# Patient Record
Sex: FEMALE | Race: WHITE | NOT HISPANIC OR LATINO | Employment: OTHER | URBAN - METROPOLITAN AREA
[De-identification: names, ages, dates, MRNs, and addresses within clinical notes are randomized per-mention and may not be internally consistent; named-entity substitution may affect disease eponyms.]

---

## 2017-01-06 ENCOUNTER — THERAPY VISIT (OUTPATIENT)
Dept: PHYSICAL THERAPY | Facility: CLINIC | Age: 62
End: 2017-01-06
Payer: COMMERCIAL

## 2017-01-06 DIAGNOSIS — M54.12 CERVICAL RADICULOPATHY: Primary | ICD-10-CM

## 2017-01-06 PROCEDURE — 97112 NEUROMUSCULAR REEDUCATION: CPT | Mod: GP

## 2017-01-06 PROCEDURE — 97140 MANUAL THERAPY 1/> REGIONS: CPT | Mod: GP

## 2017-01-06 PROCEDURE — 97110 THERAPEUTIC EXERCISES: CPT | Mod: GP

## 2017-01-06 NOTE — PROGRESS NOTES
Subjective:    HPI                    Objective:    System    Physical Exam    General     ROS    Assessment/Plan:      SUBJECTIVE  Subjective changes as noted by pt:   Pt reports that she has been dropping things lately. Has aching pain in lateral R shoulder that's becoming more prominent.   Current pain level: 3/10 Current Pain level: 3/10   Changes in function:  None     Adverse reaction to treatment or activity:  None    OBJECTIVE  Changes in objective findings:  Yes,   Objective: Mod TP and tenderness in R upper trap. R shoulder strength flexion 4+/5 pain, ABD 4/5 pain, IR and ER 5/5.  R 60, L 48 on 3rd notch WNL. + impingement R Neer and Evelio-Kirkpatrick tests.      ASSESSMENT  Marita continues to require intervention to meet STG and LTG's: PT  Patient has experienced an exacerbation of symptoms.  Response to therapy has shown an improvement in  strength and posture  Progress made towards STG/LTG?  None    PLAN  Continue current treatment plan until patient demonstrates readiness to progress to higher level exercises.    PTA/ATC plan:  Will discuss above changes with PT.    Please refer to the daily flowsheet for treatment today, total treatment time and time spent performing 1:1 timed codes.

## 2017-03-06 ENCOUNTER — THERAPY VISIT (OUTPATIENT)
Dept: PHYSICAL THERAPY | Facility: CLINIC | Age: 62
End: 2017-03-06
Payer: COMMERCIAL

## 2017-03-06 DIAGNOSIS — G89.29 CHRONIC PAIN OF RIGHT KNEE: ICD-10-CM

## 2017-03-06 DIAGNOSIS — M25.561 CHRONIC PAIN OF RIGHT KNEE: ICD-10-CM

## 2017-03-06 DIAGNOSIS — M25.551 HIP PAIN, RIGHT: Primary | ICD-10-CM

## 2017-03-06 PROCEDURE — 97110 THERAPEUTIC EXERCISES: CPT | Mod: GP | Performed by: PHYSICAL THERAPIST

## 2017-03-06 PROCEDURE — 97161 PT EVAL LOW COMPLEX 20 MIN: CPT | Mod: GP | Performed by: PHYSICAL THERAPIST

## 2017-03-06 PROCEDURE — 97530 THERAPEUTIC ACTIVITIES: CPT | Mod: GP | Performed by: PHYSICAL THERAPIST

## 2017-03-06 NOTE — MR AVS SNAPSHOT
"              After Visit Summary   3/6/2017    Marita Mata    MRN: [id number]           Patient Information     Date Of Birth          1955        Visit Information        Provider Department      3/6/2017 2:40 PM Fatimah Bravo PT [facility] Physical Therapy        Today's Diagnoses     Hip pain, right    -  1    Chronic pain of right knee           Follow-ups after your visit        Who to contact     If you have questions or need follow up information about today's clinic visit or your schedule please contact [facility] PHYS[facility] directly at [phone].  Normal or non-critical lab and imaging results will be communicated to you by [employer], letter or phone within 4 business days after the clinic has received the results. If you do not hear from us within 7 days, please contact the clinic through [employer] or phone. If you have a critical or abnormal lab result, we will notify you by phone as soon as possible.  Submit refill requests through [employer] or call your pharmacy and they will forward the refill request to us. Please allow 3 business days for your refill to be completed.          Additional Information About Your Visit        MyChart Information     [employer] lets you send messages to your doctor, view your test results, renew your prescriptions, schedule appointments and more. To sign up, go to [email or web address] . Click on \"Log in\" on the left side of the screen, which will take you to the Welcome page. Then click on \"Sign up Now\" on the right side of the page.     You will be asked to enter the access code listed below, as well as some personal information. Please follow the directions to create your username and password.     Your access code is: [email or web address]  Expires: 2017  4:39 PM     Your access code will  in 90 days. If you need help or a new code, please call your [place] clinic or [phone].   "      Care EveryWhere ID     This is your Care EveryWhere ID. This could be used by other organizations to access your [facility] medical records  [id number]         Blood Pressure from Last 3 Encounters:   10/19/16 143/74   10/12/16 135/84    Weight from Last 3 Encounters:   10/12/16 57 kg (125 lb 9.6 oz)              We Performed the Following     AD Inital Eval Report     PT Eval, Low Complexity (58392)     Therapeutic Activities     Therapeutic Exercises        Primary Care Provider    [facility]       No address on file        Thank you!     Thank you for [facility] PHYSICAL THERAPY  for your care. Our goal is always to provide you with excellent care. Hearing back from our patients is one way we can continue to improve our services. Please take a few minutes to complete the written survey that you may receive in the mail after your visit with us. Thank you!             Your Updated Medication List - Protect others around you: Learn how to safely use, store and throw away your medicines at www.disposemymeds.org.          This list is accurate as of: 3/6/17 11:59 PM.  Always use your most recent med list.                   Brand Name Dispense Instructions for use    CITALOPRAM HYDROBROMIDE PO      Take 10 mg by mouth       methylPREDNISolone 4 MG tablet    MEDROL DOSEPAK    21 tablet    Follow package instructions       tiZANidine 4 MG tablet    ZANAFLEX    30 tablet    Take 1-2 tablets (4-8 mg) by mouth 3 times daily as needed for muscle spasms

## 2017-03-06 NOTE — LETTER
[facility] PHYSICAL THERAPY   [address] [address]  [phone]    2017    Re: Marita Mata   :   1955  MRN:  [id number]   REFERRING PHYSICIAN:   Karyn Gray    [facility] PH[facility]ICAL [facility]    Date of Initial Evaluation:  3/6/2017  Visits:  Rxs Used: 1  Reason for Referral:     Hip pain, right  Chronic pain of right knee    EVALUATION SUMMARY    [facility] Initial Evaluation    Subjective:  Marita Mata is a 62 year old female with a right hip (R knee) condition.  Condition occurred with:  Degenerative joint disease and repetition/overuse (Pt. has had B hip/knee pain R > L for years due to OA. The pain had been under control since synvisc injections in . The pain flared up the past few months w/out incident. Pt. normally walks daily for exercise. No hx of hip/knee injury.).  Condition occurred: for unknown reasons.  This is a chronic condition  17.    Patient reports pain:  Medial and joint.  Radiates to:  No radiation.  Pain is described as aching and is intermittent and reported as 4/10.  Associated symptoms:  Loss of strength and loss of motion/stiffness. Pain is worse in the P.M..  Symptoms are exacerbated by standing, walking, ascending stairs and descending stairs and relieved by rest.  Since onset symptoms are unchanged.  Special tests:  X-ray.  Previous treatment: none.    General health as reported by patient is good.                Pertinent medical history includes:  Osteoarthritis, migraines and other (pain at night/rest).    Surgical history: cervical spine.  Current medications:  Anti-depressants.  Current occupation is .    Primary job tasks include:  Prolonged sitting, lifting, repetitive tasks and other (pushing/pulling).    Objective:  Standing Alignment:    Knee:  Genu varus R  Gait:    Gait Type:  Normal     Flexibility/Screens:    Positive screens:  Hip and Knee  Lower Extremity:  Decreased right lower extremity flexibility:  Hip IR's  Hip Evaluation  Hip PROM:    Abduction: Left: 55    Right: 45  External Rotation: Left: 60    Right: 40    Re: Marita Arreagabridgette   :   1955    Hip Strength:    Flexion:   Right: 4-/5   Pain:       Extension:  Right: 4-/5    Pain:    Abduction:  Right: 4/5    Pain:  External Rotation:  Right: 4/5   Pain:  Knee Flexion:  Right: 4+/5   Pain:  Knee Extension:  Right: 4+/5    Pain:  Hip Special Testing:   Special tests hip not assessed: R knee Rose's (+)   Right hip positive for the following special tests:  Cherelle and Fadir/LabrumRight hip negative for the following special tests:  SLR    Hip Palpation:  Palpations normal left hip: tender at medial R knee joint line.  Right hip tenderness present at:  Greater Trachanter and Gluteus Medius  Functional Testing:    Quad:    Single leg squat:   Left:    Right:   Moderate loss of control    Assessment/Plan:    Patient is a 62 year old female with right side hip and right side knee complaints.    Patient has the following significant findings with corresponding treatment plan.                Diagnosis 1:  R hip pain  Pain -  self management and education  Decreased ROM/flexibility - manual therapy and therapeutic exercise  Decreased strength - therapeutic exercise and therapeutic activities  Impaired muscle performance - neuro re-education  Decreased function - therapeutic activities  Diagnosis 2:  R knee pain   Pain -  self management and education  Decreased ROM/flexibility - manual therapy and therapeutic exercise  Decreased strength - therapeutic exercise and therapeutic activities  Impaired muscle performance - neuro re-education  Decreased function - therapeutic activities    Therapy Evaluation Codes:   1) History comprised of:   Personal factors that impact the plan of care:      Time since onset of symptoms.    Comorbidity factors that impact the plan  of care are:      Osteoarthritis.     Medications impacting care: None.  2) Examination of Body Systems comprised of:   Body structures and functions that impact the plan of care:      Hip and Knee.   Activity limitations that impact the plan of care are:      Stairs and Walking.  3) Clinical presentation characteristics are:   Stable/Uncomplicated.  4) Decision-Making    Low complexity using standardized patient assessment instrument and/or measureable assessment of functional outcome.  Cumulative Therapy Evaluation is: Low complexity.      Re: Marita Mata   :   1955    Previous and current functional limitations:  (See Goal Flow Sheet for this information)    Short term and Long term goals: (See Goal Flow Sheet for this information)     Communication ability:  Patient appears to be able to clearly communicate and understand verbal and written communication and follow directions correctly.  Treatment Explanation - The following has been discussed with the patient:   RX ordered/plan of care  Anticipated outcomes  Possible risks and side effects  This patient would benefit from PT intervention to resume normal activities.   Rehab potential is good.    Frequency:  1 X week, once daily  Duration:  for 1 week  Discharge Plan:  Achieve all LTG.  Independent in home treatment program.  Reach maximal therapeutic benefit.      Thank you for your referral.    INQUIRIES  Therapist: Fatimah Bravo, PT  [facility] PH[facility]ICAL THERAP[facility]  [address] [place] [address]  Phone: [phone]  Fax: [phone]

## 2017-03-07 PROBLEM — M25.551 HIP PAIN, RIGHT: Status: ACTIVE | Noted: 2017-03-07

## 2017-03-07 PROBLEM — M25.561 CHRONIC PAIN OF RIGHT KNEE: Status: ACTIVE | Noted: 2017-03-07

## 2017-03-07 PROBLEM — G89.29 CHRONIC PAIN OF RIGHT KNEE: Status: ACTIVE | Noted: 2017-03-07

## 2017-03-07 NOTE — PROGRESS NOTES
[facility] Initial Evaluation          Subjective:    Marita Mata is a 62 year old female with a right hip (R knee) condition.  Condition occurred with:  Degenerative joint disease and repetition/overuse (Pt. has had B hip/knee pain R > L for years due to OA. The pain had been under control since synvisc injections in 2014. The pain flared up the past few months w/out incident. Pt. normally walks daily for exercise. No hx of hip/knee injury.).  Condition occurred: for unknown reasons.  This is a chronic condition  1-6-17.    Patient reports pain:  Medial and joint.  Radiates to:  No radiation.  Pain is described as aching and is intermittent and reported as 4/10.  Associated symptoms:  Loss of strength and loss of motion/stiffness. Pain is worse in the P.M..  Symptoms are exacerbated by standing, walking, ascending stairs and descending stairs and relieved by rest.  Since onset symptoms are unchanged.  Special tests:  X-ray.  Previous treatment: none.    General health as reported by patient is good.                                            Objective:    Standing Alignment:              Knee:  Genu varus R      Gait:    Gait Type:  Normal         Flexibility/Screens:   Positive screens:  Hip and Knee    Lower Extremity:      Decreased right lower extremity flexibility:  Hip IR's                                                 Hip Evaluation  Hip PROM:        Abduction: Left: 55    Right: 45      External Rotation: Left: 60    Right: 40              Hip Strength:    Flexion:   Right: 4-/5   Pain:                    Extension:  Right: 4-/5    Pain:    Abduction:  Right: 4/5    Pain:      External Rotation:  Right: 4/5   Pain:  Knee Flexion:  Right: 4+/5   Pain:  Knee Extension:  Right: 4+/5    Pain:        Hip Special Testing:   Special tests hip not assessed: R knee Rose's (+)     Right hip positive for the following special tests:  Cherelle and Fadir/LabrumRight hip negative for the  following special tests:  SLR    Hip Palpation:  Palpations normal left hip: tender at medial R knee joint line.    Right hip tenderness present at:  Greater Trachanter and Gluteus Medius  Functional Testing:          Quad:    Single leg squat:   Left:    Right:   Moderate loss of control                     General     ROS    Assessment/Plan:      Patient is a 62 year old female with right side hip and right side knee complaints.    Patient has the following significant findings with corresponding treatment plan.                Diagnosis 1:  R hip pain  Pain -  self management and education  Decreased ROM/flexibility - manual therapy and therapeutic exercise  Decreased strength - therapeutic exercise and therapeutic activities  Impaired muscle performance - neuro re-education  Decreased function - therapeutic activities  Diagnosis 2:  R knee pain   Pain -  self management and education  Decreased ROM/flexibility - manual therapy and therapeutic exercise  Decreased strength - therapeutic exercise and therapeutic activities  Impaired muscle performance - neuro re-education  Decreased function - therapeutic activities    Therapy Evaluation Codes:   1) History comprised of:   Personal factors that impact the plan of care:      Time since onset of symptoms.    Comorbidity factors that impact the plan of care are:      Osteoarthritis.     Medications impacting care: None.  2) Examination of Body Systems comprised of:   Body structures and functions that impact the plan of care:      Hip and Knee.   Activity limitations that impact the plan of care are:      Stairs and Walking.  3) Clinical presentation characteristics are:   Stable/Uncomplicated.  4) Decision-Making    Low complexity using standardized patient assessment instrument and/or measureable assessment of functional outcome.  Cumulative Therapy Evaluation is: Low complexity.    Previous and current functional limitations:  (See Goal Flow Sheet for this  information)    Short term and Long term goals: (See Goal Flow Sheet for this information)     Communication ability:  Patient appears to be able to clearly communicate and understand verbal and written communication and follow directions correctly.  Treatment Explanation - The following has been discussed with the patient:   RX ordered/plan of care  Anticipated outcomes  Possible risks and side effects  This patient would benefit from PT intervention to resume normal activities.   Rehab potential is good.    Frequency:  1 X week, once daily  Duration:  for 1 week  Discharge Plan:  Achieve all LTG.  Independent in home treatment program.  Reach maximal therapeutic benefit.    Please refer to the daily flowsheet for treatment today, total treatment time and time spent performing 1:1 timed codes.

## 2017-05-09 ENCOUNTER — PRE VISIT (OUTPATIENT)
Dept: ORTHOPEDICS | Facility: CLINIC | Age: 62
End: 2017-05-09

## 2017-05-09 NOTE — TELEPHONE ENCOUNTER
1.  Date/reason for appt: 6/20/17 2:30PM Rt knee possible TKA  2.  Referring provider: Self   3.  Call to patient (Yes / No - short description): Yes, call was transferred from CC.   4.  Previous care at / records requested from:  [facility] (Helga Grissom) - 11/16/16 -1/06/17  [facility] Dr. Gray - Emailed [facility] to [email or web address]

## 2017-05-11 NOTE — TELEPHONE ENCOUNTER
Records received from [facility]. Waiting for images.   Included  Office notes: 11/7/13, 2/4/14, 2/27/17, 4/10/17, 4/17/17, 4/25/17  Radiology reports:xray bilateral knee on 2/4/14, 2/27/17   MRI left knee 2/13/14   Xray pelvis on 2/27/17  Other: knee bilateral injection 11/7/13, 2/4/14, 2/27/17, 4/10/17, 4/17/17, 4/25/17

## 2017-05-25 ENCOUNTER — TELEPHONE (OUTPATIENT)
Dept: GENERAL RADIOLOGY | Facility: CLINIC | Age: 62
End: 2017-05-25

## 2017-06-01 DIAGNOSIS — M25.561 CHRONIC PAIN OF RIGHT KNEE: Primary | ICD-10-CM

## 2017-06-01 DIAGNOSIS — G89.29 CHRONIC PAIN OF RIGHT KNEE: Primary | ICD-10-CM

## 2017-06-20 ENCOUNTER — OFFICE VISIT (OUTPATIENT)
Dept: ORTHOPEDICS | Facility: CLINIC | Age: 62
End: 2017-06-20

## 2017-06-20 VITALS — HEIGHT: 67 IN | WEIGHT: 126.5 LBS | BODY MASS INDEX: 19.85 KG/M2

## 2017-06-20 DIAGNOSIS — M25.561 CHRONIC PAIN OF RIGHT KNEE: Primary | ICD-10-CM

## 2017-06-20 DIAGNOSIS — G89.29 CHRONIC PAIN OF RIGHT KNEE: Primary | ICD-10-CM

## 2017-06-20 NOTE — PROGRESS NOTES
CHIEF CONCERN:  Right knee pain.      HISTORY OF PRESENT ILLNESS:  Ms. Mata is an otherwise healthy 62-year-old female who presents for a many year history of right knee pain attributable to osteoarthritis.  She describes a long history of nonoperative treatment for right knee osteoarthritis including Hyalgan and corticosteroid injections into the right knee since 2013.  She had a recent flare in 02/2017, which was treated with a corticosteroid injection with partial relief.  She presents to clinic today to discuss the available surgical treatment options including total knee arthroplasty.      In clinic today, she notes predominantly medial and lateral joint line pain symptoms exacerbated by activity.  She does continue to walk for exercise and cannot think of a particular activity or position that exacerbates her knee pain symptoms.  She has had an extensive nonoperative treatment program including physical therapy, a remote history of an  brace, multiple corticosteroid and Hyalgan injections.  She does not describe significant knee swelling associated with her pain symptoms, but does have associated lateral leg and hip pain symptoms which responded to a trochanteric bursa injection.      PAST MEDICAL HISTORY:  Reviewed in clinic today and is noncontributory.      PAST SURGICAL HISTORY:  Anterior cervical diskectomy and fusion at 7, 8 and 9 on 2 occasions.   Multiple previous foot surgeries.      MEDICATIONS:  Citalopram.      ALLERGIES:  Cephalexin.      SOCIAL HISTORY:  Ms. 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  She is currently under work restrictions which have improved her knee pain symptoms.  She does not smoke.      FAMILY HISTORY:  Reviewed, negative for bleeding disorders, clotting disorders or difficulty with anesthesia.      PHYSICAL EXAMINATION:  Ms. Mata is a pleasant 62-year-old female who stands 5 feet  6 inches tall, weighs 126 pounds, for a BMI of 20.09.  Focused evaluation of the right knee reveals intact skin without lacerations, abrasions previous surgical incisions.  Her passive knee range of motion is 7 degrees flexion contracture to 128 degrees of knee flexion.  She is able to perform an active straight leg raise with a prominent crepitation with active knee range of motion.  Her knee is stable to varus and valgus stress in full extension and 30 degrees of knee flexion.  There is no intraarticular effusion appreciable.      IMAGING:  Standing entire lower extremity alignment radiographs demonstrate an 11-degree valgus alignment with the weightbearing line just touching the lateral tibial plateau.  Previously obtained bent knee PA radiographs demonstrate significant posterolateral osteoarthritis with obliteration duration of the joint line.  She has lateral and posterior spurring.      ASSESSMENT:  Right knee osteoarthritis with valgus alignment.      PLAN:  We had an extensive discussion with Ms. Mata in clinic today with regards to her right knee pain and the available treatment options.  We discussed that she does have advanced right knee osteoarthritis but given her young age and high activity level, we had an extensive discussion regarding the risks, benefits and alternatives to total knee arthroplasty.  We discussed that if she is continuing to have success with intraarticular injections, she could consider continuing regularly scheduled injections with Dr. Karyn Gray and observing her symptoms.  We discussed that should she begin to limit or avoids certain activities due to knee pain and have limitations in her walking, she could return for reevaluation and re-discussion of total knee arthroplasty.  We discussed that total knee arthroplasties likely would preclude her from returning to a lifting, carrying job and that it would be prudent to continue her current work restrictions as they have  been helpful for her current symptoms and would be appropriate should she end up deciding to proceed with a total knee arthroplasty.      Total time 30 minutes, greater than 50% of which was spent in counseling and coordinating care.      cc:  Karyn Gray MD        [facility]     I have personally examined this patient and have reviewed the clinical presentation and progress note with the resident.  I agree with the treatment plan as outlined.  The plan was formulated with the resident on the day of the resident dictation.    Lupe Gutierrez

## 2017-06-20 NOTE — LETTER
6/20/2017       RE: Marita Mata  [address]  [place] [address]     Dear Colleague,    Thank you for referring your patient, Marita Mata, to the [facility] at [facility]. Please see a copy of my visit note below.    CHIEF CONCERN:  Right knee pain.      HISTORY OF PRESENT ILLNESS:  Ms. Mata is an otherwise healthy 62-year-old female who presents for a many year history of right knee pain attributable to osteoarthritis.  She describes a long history of nonoperative treatment for right knee osteoarthritis including Hyalgan and corticosteroid injections into the right knee since 2013.  She had a recent flare in 02/2017, which was treated with a corticosteroid injection with partial relief.  She presents to clinic today to discuss the available surgical treatment options including total knee arthroplasty.      In clinic today, she notes predominantly medial and lateral joint line pain symptoms exacerbated by activity.  She does continue to walk for exercise and cannot think of a particular activity or position that exacerbates her knee pain symptoms.  She has had an extensive nonoperative treatment program including physical therapy, a remote history of an  brace, multiple corticosteroid and Hyalgan injections.  She does not describe significant knee swelling associated with her pain symptoms, but does have associated lateral leg and hip pain symptoms which responded to a trochanteric bursa injection.      PAST MEDICAL HISTORY:  Reviewed in clinic today and is noncontributory.      PAST SURGICAL HISTORY:  Anterior cervical diskectomy and fusion at 7, 8 and 9 on 2 occasions.   Multiple previous foot surgeries.      MEDICATIONS:  Citalopram.      ALLERGIES:  Cephalexin.      SOCIAL HISTORY:  Ms. 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  She is  currently under work restrictions which have improved her knee pain symptoms.  She does not smoke.      FAMILY HISTORY:  Reviewed, negative for bleeding disorders, clotting disorders or difficulty with anesthesia.      PHYSICAL EXAMINATION:  Ms. Mata is a pleasant 62-year-old female who stands 5 feet 6 inches tall, weighs 126 pounds, for a BMI of 20.09.  Focused evaluation of the right knee reveals intact skin without lacerations, abrasions previous surgical incisions.  Her passive knee range of motion is 7 degrees flexion contracture to 128 degrees of knee flexion.  She is able to perform an active straight leg raise with a prominent crepitation with active knee range of motion.  Her knee is stable to varus and valgus stress in full extension and 30 degrees of knee flexion.  There is no intraarticular effusion appreciable.      IMAGING:  Standing entire lower extremity alignment radiographs demonstrate an 11-degree valgus alignment with the weightbearing line just touching the lateral tibial plateau.  Previously obtained bent knee PA radiographs demonstrate significant posterolateral osteoarthritis with obliteration duration of the joint line.  She has lateral and posterior spurring.      ASSESSMENT:  Right knee osteoarthritis with valgus alignment.      PLAN:  We had an extensive discussion with Ms. Mata in clinic today with regards to her right knee pain and the available treatment options.  We discussed that she does have advanced right knee osteoarthritis but given her young age and high activity level, we had an extensive discussion regarding the risks, benefits and alternatives to total knee arthroplasty.  We discussed that if she is continuing to have success with intraarticular injections, she could consider continuing regularly scheduled injections with Dr. Karyn Gray and observing her symptoms.  We discussed that should she begin to limit or avoids certain activities due to knee pain and have  limitations in her walking, she could return for reevaluation and re-discussion of total knee arthroplasty.  We discussed that total knee arthroplasties likely would preclude her from returning to a lifting, carrying job and that it would be prudent to continue her current work restrictions as they have been helpful for her current symptoms and would be appropriate should she end up deciding to proceed with a total knee arthroplasty.      Total time 30 minutes, greater than 50% of which was spent in counseling and coordinating care.      cc:  Karyn Gray MD        [facility]     I have personally examined this patient and have reviewed the clinical presentation and progress note with the resident.  I agree with the treatment plan as outlined.  The plan was formulated with the resident on the day of the resident dictation.    Lupe Gutierrez

## 2017-06-20 NOTE — NURSING NOTE
"Reason For Visit:   Chief Complaint   Patient presents with     RECHECK     right knee pain, wants Dr. Gutierrez\"s opinion on a TKA     Primary: DENISHA Vee  Ref. MD: self    Occupation .  Currently working? Yes.  Work status?  Full time.  Date of injury: none    Date of surgery: none    Smoker: No      Ht 1.69 m (5' 6.53\")  Wt 57.4 kg (126 lb 8 oz)  BMI 20.09 kg/m2      Pain Assessment  Patient Currently in Pain: Yes  0-10 Pain Scale:  (2 - 9)  Primary Pain Location: Knee  Pain Orientation: Right  Pain Descriptors: Aching, Sharp  Alleviating Factors: Other (comment) (sometimes nothing helps)  Aggravating Factors: Other (comment) (pivoting motion)                                                 "

## 2017-06-20 NOTE — MR AVS SNAPSHOT
"              After Visit Summary   6/20/2017    Marita Mata    MRN: [id number]           Patient Information     Date Of Birth          1955        Visit Information        Provider Department      6/20/2017 2:30 PM Lupe Gutierrez MD [facility]        Today's Diagnoses     Chronic pain of right knee    -  1       Follow-ups after your visit        Who to contact     Please call your clinic at [phone] to:    Ask questions about your health    Make or cancel appointments    Discuss your medicines    Learn about your test results    Speak to your doctor   If you have compliments or concerns about an experience at your clinic, or if you wish to file a complaint, please contact [facility] Patient Relations at [phone] or email us at [email or web address]         Additional Information About Your Visit        MyChart Information     [employer] gives you secure access to your electronic health record. If you see a primary care provider, you can also send messages to your care team and make appointments. If you have questions, please call your primary care clinic.  If you do not have a primary care provider, please call [phone] and they will assist you.      [employer] is an electronic gateway that provides easy, online access to your medical records. With [employer], you can request a clinic appointment, read your test results, renew a prescription or communicate with your care team.     To access your existing account, please contact your [facility] or call [phone] for assistance.        Care EveryWhere ID     This is your Care EveryWhere ID. This could be used by other organizations to access your [place] medical records  [id number]        Your Vitals Were     Height BMI (Body Mass Index)                1.69 m (5' 6.53\") 20.09 kg/m2           Blood Pressure from Last 3 Encounters:   No data found for BP    " Weight from Last 3 Encounters:   No data found for Wt              Today, you had the following     No orders found for display         Today's Medication Changes          These changes are accurate as of: 6/20/17 11:59 PM.  If you have any questions, ask your nurse or doctor.               Stop taking these medicines if you haven't already. Please contact your care team if you have questions.     methylPREDNISolone 4 MG tablet   Commonly known as:  MEDROL DOSEPAK   Stopped by:  Lupe Gutierrez MD                    Primary Care Provider    [facility]       No address on file        Equal Access to Services     [facility]: Hadii jeff ku hadasho Soomaali, waaxda luqadaha, qaybta kaalmada adeegyada, matty lawson . So [facility] [phone].    ATENCIÓN: Si habla español, tiene a flowers disposición servicios gratuitos de asistencia lingüística. Llame al [phone].    We comply with applicable federal civil rights laws and [place] laws. We do not discriminate on the basis of race, color, national origin, age, disability sex, sexual orientation or gender identity.            Thank you!     Thank you for choosing [facility]  for your care. Our goal is always to provide you with excellent care. Hearing back from our patients is one way we can continue to improve our services. Please take a few minutes to complete the written survey that you may receive in the mail after your visit with us. Thank you!             Your Updated Medication List - Protect others around you: Learn how to safely use, store and throw away your medicines at www.disposemymeds.org.          This list is accurate as of: 6/20/17 11:59 PM.  Always use your most recent med list.                   Brand Name Dispense Instructions for use Diagnosis    CITALOPRAM HYDROBROMIDE PO      Take 10 mg by mouth        tiZANidine 4 MG tablet    ZANAFLEX    30 tablet    Take 1-2 tablets (4-8 mg)  by mouth 3 times daily as needed for muscle spasms    Degeneration of cervical intervertebral disc       TRAZODONE HCL PO      Take 25 mg by mouth nightly as needed for sleep

## 2017-08-05 ENCOUNTER — HEALTH MAINTENANCE LETTER (OUTPATIENT)
Age: 62
End: 2017-08-05

## 2018-06-18 PROBLEM — M25.561 CHRONIC PAIN OF RIGHT KNEE: Status: RESOLVED | Noted: 2017-03-07 | Resolved: 2018-06-18

## 2018-06-18 PROBLEM — M25.551 HIP PAIN, RIGHT: Status: RESOLVED | Noted: 2017-03-07 | Resolved: 2018-06-18

## 2018-06-18 PROBLEM — G89.29 CHRONIC PAIN OF RIGHT KNEE: Status: RESOLVED | Noted: 2017-03-07 | Resolved: 2018-06-18

## 2018-06-18 NOTE — PROGRESS NOTES
Pt did not follow up in PT after this date of service. Will continue to use home traction unit; DC to [facility]P

## 2018-07-25 ENCOUNTER — TRANSFERRED RECORDS (OUTPATIENT)
Dept: HEALTH INFORMATION MANAGEMENT | Facility: CLINIC | Age: 63
End: 2018-07-25

## 2018-08-03 ENCOUNTER — ANESTHESIA EVENT (OUTPATIENT)
Dept: SURGERY | Facility: CLINIC | Age: 63
DRG: 470 | End: 2018-08-03
Payer: COMMERCIAL

## 2018-08-07 ENCOUNTER — HOSPITAL ENCOUNTER (OUTPATIENT)
Dept: EDUCATION SERVICES | Facility: CLINIC | Age: 63
End: 2018-08-07
Payer: COMMERCIAL

## 2018-08-07 NOTE — PLAN OF CARE
8/7/18 Patient(pt)and  to PLC Pre Op Joint Replacement group class. Both very attentive,asked a few questions,able to answer teach back,and verbalized understanding of content presented.Continue to reinforce information.See education flow sheet.  Written material given and reviewed in group class: PLC Class packet and Joint Replacement Book.

## 2018-08-08 ENCOUNTER — ALLIED HEALTH/NURSE VISIT (OUTPATIENT)
Dept: SURGERY | Facility: CLINIC | Age: 63
End: 2018-08-08
Payer: COMMERCIAL

## 2018-08-08 ENCOUNTER — APPOINTMENT (OUTPATIENT)
Dept: SURGERY | Facility: CLINIC | Age: 63
End: 2018-08-08
Payer: COMMERCIAL

## 2018-08-08 ENCOUNTER — OFFICE VISIT (OUTPATIENT)
Dept: SURGERY | Facility: CLINIC | Age: 63
End: 2018-08-08
Payer: COMMERCIAL

## 2018-08-08 VITALS
RESPIRATION RATE: 16 BRPM | SYSTOLIC BLOOD PRESSURE: 135 MMHG | TEMPERATURE: 99.4 F | OXYGEN SATURATION: 98 % | DIASTOLIC BLOOD PRESSURE: 80 MMHG | BODY MASS INDEX: 20.49 KG/M2 | WEIGHT: 127.5 LBS | HEIGHT: 66 IN | HEART RATE: 69 BPM

## 2018-08-08 DIAGNOSIS — Z01.818 PREOP EXAMINATION: ICD-10-CM

## 2018-08-08 DIAGNOSIS — Z01.818 PREOP EXAMINATION: Primary | ICD-10-CM

## 2018-08-08 LAB
ALBUMIN UR-MCNC: NEGATIVE MG/DL
ANION GAP SERPL CALCULATED.3IONS-SCNC: 6 MMOL/L (ref 3–14)
APPEARANCE UR: ABNORMAL
BILIRUB UR QL STRIP: NEGATIVE
BUN SERPL-MCNC: 15 MG/DL (ref 7–30)
CALCIUM SERPL-MCNC: 9.8 MG/DL (ref 8.5–10.1)
CHLORIDE SERPL-SCNC: 98 MMOL/L (ref 94–109)
CO2 SERPL-SCNC: 29 MMOL/L (ref 20–32)
COLOR UR AUTO: YELLOW
CREAT SERPL-MCNC: 0.65 MG/DL (ref 0.52–1.04)
ERYTHROCYTE [DISTWIDTH] IN BLOOD BY AUTOMATED COUNT: 11.8 % (ref 10–15)
GFR SERPL CREATININE-BSD FRML MDRD: >90 ML/MIN/1.7M2
GLUCOSE SERPL-MCNC: 90 MG/DL (ref 70–99)
GLUCOSE UR STRIP-MCNC: NEGATIVE MG/DL
HCT VFR BLD AUTO: 42.9 % (ref 35–47)
HGB BLD-MCNC: 14.5 G/DL (ref 11.7–15.7)
HGB UR QL STRIP: ABNORMAL
KETONES UR STRIP-MCNC: 5 MG/DL
LEUKOCYTE ESTERASE UR QL STRIP: ABNORMAL
MCH RBC QN AUTO: 33.7 PG (ref 26.5–33)
MCHC RBC AUTO-ENTMCNC: 33.8 G/DL (ref 31.5–36.5)
MCV RBC AUTO: 100 FL (ref 78–100)
MRSA DNA SPEC QL NAA+PROBE: NEGATIVE
NITRATE UR QL: NEGATIVE
PH UR STRIP: 7 PH (ref 5–7)
PLATELET # BLD AUTO: 280 10E9/L (ref 150–450)
POTASSIUM SERPL-SCNC: 4 MMOL/L (ref 3.4–5.3)
RBC # BLD AUTO: 4.3 10E12/L (ref 3.8–5.2)
RBC #/AREA URNS AUTO: 1 /HPF (ref 0–2)
SODIUM SERPL-SCNC: 134 MMOL/L (ref 133–144)
SOURCE: ABNORMAL
SP GR UR STRIP: 1.01 (ref 1–1.03)
SPECIMEN SOURCE: NORMAL
SQUAMOUS #/AREA URNS AUTO: <1 /HPF (ref 0–1)
UROBILINOGEN UR STRIP-MCNC: 0 MG/DL (ref 0–2)
WBC # BLD AUTO: 6.2 10E9/L (ref 4–11)
WBC #/AREA URNS AUTO: 2 /HPF (ref 0–5)

## 2018-08-08 RX ORDER — BIOTIN 1 MG
1000 TABLET ORAL DAILY
COMMUNITY

## 2018-08-08 RX ORDER — IPRATROPIUM BROMIDE AND ALBUTEROL SULFATE 2.5; .5 MG/3ML; MG/3ML
3 SOLUTION RESPIRATORY (INHALATION) ONCE
Status: CANCELLED | OUTPATIENT
Start: 2018-08-08 | End: 2018-08-08

## 2018-08-08 ASSESSMENT — ENCOUNTER SYMPTOMS: ORTHOPNEA: 0

## 2018-08-08 ASSESSMENT — LIFESTYLE VARIABLES: TOBACCO_USE: 1

## 2018-08-08 NOTE — ANESTHESIA PREPROCEDURE EVALUATION
Anesthesia Evaluation     . Pt has had prior anesthetic. Type: General and MAC    No history of anesthetic complications          ROS/MED HX    ENT/Pulmonary: Comment: Chronic sinusitis, allergies.    (+)allergic rhinitis, tobacco use, Past use Moderate Persistent asthma Last exacerbation: Current,Treatment: Inhaled steroids and Inhaler prn,  , . .   (-) recent URI   Neurologic: Comment: Migraines (cluster) are being managed well with Botox.      (+)neuropathy - Slight numbness in the toes of both feet., migraines,     Cardiovascular:  - neg cardiovascular ROS      (-) taking anticoagulants/antiplatelets, SHIPLEY, orthopnea/PND, syncope and irregular heartbeat/palpitations   METS/Exercise Tolerance:  >4 METS   Hematologic:     (+) History of blood clots pt is not anticoagulated, -      Musculoskeletal:   (+) arthritis, , , -       GI/Hepatic:  - neg GI/hepatic ROS       Renal/Genitourinary:  - ROS Renal section negative       Endo:     (+) thyroid problem  Thyroid disease - Other Multinodualar goiter--being followed.  , .   (-) chronic steroid usage   Psychiatric:     (+) psychiatric history anxiety      Infectious Disease:  - neg infectious disease ROS       Malignancy:      - no malignancy   Other:    (+) H/O Chronic Pain,                   Physical Exam  Normal systems: cardiovascular, pulmonary and dental    Airway   Mallampati: II  TM distance: >3 FB  Neck ROM: full    Dental     Cardiovascular       Pulmonary                PAC Discussion and Assessment    ASA Classification: 2  Case is suitable for: [facility]  Anesthetic techniques and relevant risks discussed: GA and GA with regional block for post-op pain control  Invasive monitoring and risk discussed:   Types:   Possibility and Risk of blood transfusion discussed:   NPO instructions given:   Additional anesthetic preparation and risks discussed:   Needs early admission to pre-op area:   Other:     PAC Resident/NP Anesthesia Assessment:  Marita Mata is  a 63 year old female scheduled to undergo Right Total Knee Arthroplasty on 8/23/18 with Dr. Lupe Gutierrez.    She has the following specific operative considerations:   1.  Increased risk of postoperative nausea/vomiting: Recommend use of antiemetic agents in the perioperative period.    2.  Asthma: Patient instructed to use QVar and Albuterol inhalers, and take Zyrtec and Singulair as prescribed.  Orders entered for a Duo-Neb to be administered in Pre-Op.  Consider administration of a bronchodilator in the perioperative setting, if needed.    3.  Chronic pain: Recommend careful positioning throughout the perioperative period to prevent injury or exacerbation of pain.    4.  Type & screen, CBC, BMP, UA, MSSA today.    Revised Cardiac Risk Index: 0.4% risk of major adverse cardiac event.  SID risk: Low  PONV risk score= 3.  (If > 2, anti-emetic intervention is recommended.)  Anesthesia considerations: Refer to PAC assessment in the anesthesia records.      Reviewed and Signed by PAC Mid-Level Provider/Resident  Mid-Level Provider/Resident: Kajal Juarez CNP  Date: 8/8/18  Time:     Attending Anesthesiologist Anesthesia Assessment:        Anesthesiologist:   Date:   Time:   Pass/Fail:   Disposition:     PAC Pharmacist Assessment:        Pharmacist:   Date:   Time:      Anesthesia Plan      History & Physical Review  History and physical reviewed and following examination; no interval change.    ASA Status:  2 .    NPO Status:  > 8 hours    Plan for Spinal and MAC with Intravenous induction. Maintenance will be TIVA.  Reason for MAC:  Deep or markedly invasive procedure (G8)  PONV prophylaxis:  Ondansetron (or other 5HT-3) and Dexamethasone or Solumedrol       Postoperative Care  Postoperative pain management:  IV analgesics, Oral pain medications and Multi-modal analgesia.      Consents  Anesthetic plan, risks, benefits and alternatives discussed with:  Patient..                          .

## 2018-08-08 NOTE — MR AVS SNAPSHOT
After Visit Summary   2018    Marita Mata    MRN: [id number]           Patient Information     Date Of Birth          1955        Visit Information        Provider Department      2018 5:30 PM Rn, [facility] Preoperative [facility]        Today's Diagnoses     Preop examination          Care Instructions    Preparing for Your Surgery      Name:  Marita Mata   MRN:  [id number]   :  1955   Today's Date:  2018     Arriving for surgery:  Surgery date:  28  Arrival time:  12:10 pm    Please come to:   [facility] [address]  [address] [place], [place] [address]  [address]    - parking is available in front of [facility] from 5:15AM to 8:00PM. If you prefer, park your car in the Green Lot.    -Proceed to the 3rd floor, check in at the Adult Surgery Waiting Lounge. [phone]    If an escort is needed stop at the Information Desk in the lobby. Inform the information person that you are here for surgery. An escort to the Adult Surgery Waiting Lounge will be provided.    -  Bring your ID and insurance card.    Enhanced Recovery After Surgery     This is a team effort, including you, to get you back on your feet, eating and drinking normally and out of the hospital as quickly as possible.  The goals are: 1) NO INFECTIONS and   2) RETURN TO NORMAL DIET    How can we achieve these goals?  1) STAY ACTIVE: Walk every day before your surgery; try to increase the amount every day.  Walk after surgery as much as you can-the nurses will help you.  Walking speeds healing and gets you home quicker, you heal better at home and have less risk of infection.     2) INCENTIVE SPIROMETER: Practice your incentive spirometer 4 times per day with 5-10 repetitions each time.  Using the incentive spirometer can strengthen your muscles between your ribs and help you have a strong cough after surgery.  A more effective cough can help  prevent problems with your lungs.    3) STAY HYDRATED: Drink clear liquids up until 2 hours before your surgery. We would like you to purchase a drink such as Gatorade or Ensure Clear (not the milkshake type).  Drink this before bedtime and on the way into the hospital, drink between 8-12 ounces or until you feel hydrated.  Keeping well hydrated leads to your veins being plump, you wake up faster, and you are less likely to be nauseated. Start drinking water as soon as you can after surgery and advance to clear liquids and food as tolerated.  IV fluids contain salt, drinking fluids will minimize the amount of IV fluids you need and decrease the amount of salt you get.    The most common reason for the patient to be readmitted is dehydration. Staying hydrated after you go home from the hospital is very important.  Ensure or Ensure Clear are good options to keep you hydrated.     4) PAIN MANAGEMENT: If we minimize the amount of opioids and narcotics, and use regional blocks (which numb the area where your surgery is) along with oral pain medications; you will have less side effects of nausea and constipation. Narcotics can slow down your bowels and cause you to stay in the hospital longer.     Our goal is to keep you comfortable; eating and drinking normally and back home safely.     What can I eat or drink?  -  You may have solid food or milk products until 8 hours prior to your surgery. (Until 6:40 am )  -  You may have water, apple juice or 7up/Sprite until 2 hours prior to your surgery. (Until 12:10 pm )    Which medicines can I take?       -  Do not bring your own medications to the hospital, except for inhalers.        -  Follow Orthopedic Clinic instructions regarding Ibuprofen. If no instructions given, NO Ibuprofen the day prior to surgery.         -  Hold all Vitamins and Supplements 7 days prior to surgery.        -  Hold Voltaren gel 1 day prior to surgery.    -  Please take these medications the morning  of surgery:  Citalopram, QVAR inhaler,       Albuterol inhaler if needed      Acetaminophen (Tylenol) if needed    How do I prepare myself?  -  Take two showers: one the night before surgery; and one the morning of surgery.         Use Scrubcare or Hibiclens to wash from neck down.  You may use your own shampoo and conditioner. No other hair products.   -  Do NOT use lotion, powder, colognes, deodorant, or antiperspirant the day of your surgery.  -  Do NOT wear any makeup, fingernail polish or jewelry.    -  Begin using Incentive Spirometer 1 week prior to surgery.  Use 4 times per day, up to 5-10 breaths each time.  Bring Incentive Spirometer to hospital.    Questions or Concerns:  If you have questions or concerns prior to your surgery, call [phone]. (Mon - Fri   8 am- 5:30 pm)  Questions after surgery, contact your Surgeons office.      AFTER YOUR SURGERY  Breathing exercises   Breathing exercises help you recover faster. Take deep breaths and let the air out slowly. This will:     Help you wake up after surgery.    Help prevent complications like pneumonia.  Preventing complications will help you go home sooner.   Nausea and vomiting   You may feel sick to your stomach after surgery; if so, let your nurse know.    Pain control:  After surgery, you may have pain. Our goal is to help you manage your pain. Pain medicine will help you feel comfortable enough to do activities that will help you heal.  These activities may include breathing exercises, walking and physical therapy.   To help your health care team treat your pain we will ask: 1) If you have pain  2) where it is located 3) describe your pain in your words  Methods of pain control include medications given by mouth, vein or by nerve block for some surgeries.  We may give you a pain control pump that will:  1) Deliver the medicine through a tube placed in your vein  2) Control the amount of medicine you receive  3) Allow you to push a button to  deliver a dose of pain medicine  Sequential Compression Device (SCD) or Pneumo Boots:  You may need to wear SCD S on your legs or feet. These are wraps connected to a machine that pumps in air and releases it. The repeated pumping helps prevent blood clots from forming.   Using an Incentive Spirometer    An incentive spirometer is a device that helps you do deep breathing exercises. These exercises expand your lungs, aid in circulation, and help prevent pneumonia. Deep breathing exercises also help you breathe better and improve the function of your lungs by:    Keeping your lungs clear    Strengthening your breathing muscles    Helping prevent respiratory complications or problems  The incentive spirometer gives you a way to take an active part in your care. A nurse or therapist will teach you breathing exercises. To do these exercises, you will breathe in through your mouth and not your nose. The incentive spirometer only works correctly if you breathe in through your mouth.    Steps to clear lungs  Step 1. Exhale normally. Then, inhale normally.    Relax and breathe out.  Step 2. Place your lips tightly around the mouthpiece.    Make sure the device is upright and not tilted.  Step 3. Inhale as much air as you can through the mouthpiece (don't breath through your nose).    Inhale slowly and deeply.    Hold your breath long enough to keep the balls or disk raised for at least 3 to 5 seconds, or as instructed by your healthcare provider.  Step 4. Repeat the exercise regularly.              Follow-ups after your visit        Your next 10 appointments already scheduled     Aug 08, 2018  5:30 PM CDT   (Arrive by 5:15 PM)   PAC RN ASSESSMENT with [place] Pac Rn   [facility] Preoperative Assessment Center ([facility])    [address] [facility]  [address] [facility] [id number] [phone]            Aug 23, 2018   Procedure with Lupe Gutierrez MD   [facility], [place], Same Day Surgery (--)     [address]  [facility] [address]   [phone]              Future tests that were ordered for you today     Open Future Orders        Priority Expected Expires Ordered    ABO/Rh type and screen Routine 8/8/2018 9/7/2018 8/8/2018    Basic metabolic panel Routine 8/8/2018 9/7/2018 8/8/2018    CBC with platelets Routine 8/8/2018 9/7/2018 8/8/2018    UA reflex to Microscopic and Culture Routine 8/8/2018 9/7/2018 8/8/2018    Methicillin Resistant Staph Aureus PCR Routine  8/9/2019 8/8/2018            Who to contact     Please call your clinic at [phone] to:    Ask questions about your health    Make or cancel appointments    Discuss your medicines    Learn about your test results    Speak to your doctor            Additional Information About Your Visit        [email or web address] Information     [employer] gives you secure access to your electronic health record. If you see a primary care provider, you can also send messages to your care team and make appointments. If you have questions, please call your primary care clinic.  If you do not have a primary care provider, please call [phone] and they will assist you.      [employer] is an electronic gateway that provides easy, online access to your medical records. With [employer], you can request a clinic appointment, read your test results, renew a prescription or communicate with your care team.     To access your existing account, please contact your [facility] or call [phone] for assistance.        Care EveryWhere ID     This is your Care EveryWhere ID. This could be used by other organizations to access your [facility] medical records  [id number]         Blood Pressure from Last 3 Encounters:   08/08/18 135/80   05/31/17 137/78   10/19/16 143/74    Weight from Last 3 Encounters:   08/08/18 57.8 kg (127 lb 8 oz)   06/20/17 57.4 kg (126 lb 8 oz)   10/12/16 57 kg (125 lb 9.6 oz)              We Performed the Following     Methicillin  Resistant Staph Aureus PCR        Primary Care Provider Office Phone # Fax #    Laury OTILIO Heredia [phone]       [facility] [address] [id number]        Equal Access to Services     ANDREW MARTINEZ : Hadii jeff cardoza hadjustao Soomaali, waaxda luqadaha, qaybta kaalmada adeegyada, matty gilmerin hayaajunior olsonharjinder buenosanty haque. So [facility] [phone].    ATENCIÓN: Si habla español, tiene a flowers disposición servicios gratuitos de asistencia lingüística. Llame al [phone].    We comply with applicable federal civil rights laws and [place] laws. We do not discriminate on the basis of race, color, national origin, age, disability, sex, sexual orientation, or gender identity.            Thank you!     Thank you for choosing [facility] PREOPERAT[facility]  for your care. Our goal is always to provide you with excellent care. Hearing back from our patients is one way we can continue to improve our services. Please take a few minutes to complete the written survey that you may receive in the mail after your visit with us. Thank you!             Your Updated Medication List - Protect others around you: Learn how to safely use, store and throw away your medicines at www.disposemymeds.org.          This list is accurate as of 8/8/18  5:20 PM.  Always use your most recent med list.                   Brand Name Dispense Instructions for use Diagnosis    biotin 1000 MCG Tabs tablet      Take 1,000 mcg by mouth daily        CITALOPRAM HYDROBROMIDE PO      Take 20 mg by mouth daily        diclofenac 1 % Gel topical gel    VOLTAREN     Place onto the skin nightly as needed for moderate pain        MAGNESIUM OXIDE PO      Take 500 mg by mouth daily        OMEGA 3 PO      Take 1,000 mg by mouth daily        Pseudoephedrine-Ibuprofen  MG Tabs      Take 1 tablet by mouth daily        QVAR IN      Inhale 1 puff into the lungs 2 times daily        SINGULAIR PO      Take 10 mg by mouth At  Bedtime        TRAZODONE HCL PO      Take 25 mg by mouth nightly as needed for sleep        UNABLE TO FIND      Supplement BCQ 2 tablets in AM        UNABLE TO FIND      Supplement Vital Protein Powder 2 scoops in AM        VENTOLIN IN      Inhale 2 puffs into the lungs every 4 hours as needed        VITAMIN B-2 PO      Take 400 mg by mouth daily        VITAMIN D-3 PO      Take 5,000 Units by mouth daily        ZYRTEC ALLERGY PO      Take 10 mg by mouth At Bedtime

## 2018-08-08 NOTE — H&P
Pre-Operative H & P     Date of Encounter: 8/8/2018  Primary Care Physician:  Laury Heredia    CC: Right knee osteoarthritis that is no longer responding to medical therapy.      HPI:  Marita Mata is a 63 year old female who presents for pre-operative H & P in preparation for Right Total Knee Arthroplasty on 8/23/18 with Dr. Lupe Gutierrez at [facility].     The patient was evaluated by Dr. Gutierrez on 6/20/17 in regards to chronic right knee pain.  She reported that she has been experiencing the pain for many years, and treatments including bracing, physical therapy, and multiple corticosteroid and Hyalgan injections.  At that time, it was felt that due to her young age and high activity level, and the fact that she was continuing to have success with intraarticular injections, the patient should consider continuing injections on a regular basis.    More recently, she was evaluated on 3/14/18 and reported constant aching pain localized around the anterior medial aspect of the right knee.  Due to her history of spine issues, she was advised to follow up with her pain specialist, and a trial with an  brace was also recommended.  She was reevaluated on 7/25 and reported that she had been experiencing increased pain in the right knee.  She also noted that her knee would give out, causing her to slip.  She felt that nonsurgical interventions were not longer effective, and wanted to discuss total knee replacement.  Arrangements are now being made for the above procedures.    History is obtained from the patient and the electronic medical record.    Past Medical History:  Past Medical History:   Diagnosis Date     Anxiety      Chronic pain      History of DVT (deep vein thrombosis)      Migraines      Multinodular goiter      Osteoarthritis      Past Surgical History:  Past Surgical History:   Procedure Laterality Date     C ORAL SURGERY PROCEDURE      Upper  jaw surgery     CYSTOSCOPY       ORTHOPEDIC SURGERY      Toe     ORTHOPEDIC SURGERY      Foot     SEPTOPLASTY       SPINE SURGERY      Cervical fusion     TONSILLECTOMY       Prior to admission medications  Current Outpatient Prescriptions   Medication Sig Dispense Refill     Albuterol (VENTOLIN IN) Inhale 2 puffs into the lungs every 4 hours as needed       Beclomethasone Dipropionate (QVAR IN) Inhale 1 puff into the lungs 2 times daily       biotin 1000 MCG TABS tablet Take 1,000 mcg by mouth daily       Cetirizine HCl (ZYRTEC ALLERGY PO) Take 10 mg by mouth At Bedtime       Cholecalciferol (VITAMIN D-3 PO) Take 5,000 Units by mouth daily       diclofenac (VOLTAREN) 1 % GEL topical gel Place onto the skin nightly as needed for moderate pain       MAGNESIUM OXIDE PO Take 500 mg by mouth daily       Montelukast Sodium (SINGULAIR PO) Take 10 mg by mouth At Bedtime       Omega-3 Fatty Acids (OMEGA 3 PO) Take 1,000 mg by mouth daily       Pseudoephedrine-Ibuprofen  MG TABS Take 1 tablet by mouth daily       Riboflavin (VITAMIN B-2 PO) Take 400 mg by mouth daily       UNABLE TO FIND Supplement BCQ 2 tablets in AM       UNABLE TO FIND Supplement Vital Protein Powder 2 scoops in AM       CITALOPRAM HYDROBROMIDE PO Take 20 mg by mouth daily        TRAZODONE HCL PO Take 25 mg by mouth nightly as needed for sleep       Allergies  Allergies   Allergen Reactions     Cephalexin Swelling     Morphine Nausea     Social History  Social History     Social History     Marital status:      Spouse name: N/A     Number of children: N/A     Years of education: N/A     Occupational History     Not on file.     Social History Main Topics     Smoking status: Former Smoker     Packs/day: 1.00     Years: 3.00     Types: Cigarettes     Quit date: 1982     Smokeless tobacco: Never Used     Alcohol use Yes      Comment: 1-2 beers a night     Drug use: No     Sexual activity: Not on file     Other Topics Concern     Not on file      Social History Narrative     No narrative on file     Family History  Family History   Problem Relation Age of Onset     Dementia Mother      LUNG DISEASE Father      Exposure to coal, etc     Prostate Cancer Father      Connective Tissue Disorder Sister      Allergy (Severe) Brother      No Known Problems Maternal Grandmother      Medical History Unknown Maternal Grandfather      Dementia Paternal Grandmother      Other - See Comments Sister      Fibromyalgia     No Known Problems Sister      No Known Problems Sister      No Known Problems Sister      Medical History Unknown Paternal Grandfather      Hx of Blood transfusions/reactions: Denies.    Hx of abnormal bleeding or anti-platelet use: Denies.    Menstrual history: No LMP recorded. Patient is postmenopausal.    Steroid use in the last year: Denies.    Personal or FH of difficulty with anesthesia: Denies.    Review of Systems  Functional status: Independent in ADL's.  >4 METS.     The complete review of systems is negative other than noted in the HPI or here.   Constitutional: Denies recent changes in weight, or fevers/chills.  Notes that she wakes frequently during the night.    Eyes: No recent vision changes.  EENT: Denies recent changes in hearing, mouth pain, or difficulty swallowing.  Cardiovascular: Denies chest pain, SHIPLEY or orthopnea, or palpitations.  Respiratory: Denies shortness of breath or significant cough.    GI: Denies nausea/vomiting or diarrhea/constipation.    : Denies dysuria.    Musculoskeletal: Denies joint pain or swelling, with the exception of the right knee.    Skin: Denies rashes or wounds.    Hematologic: Denies easy bruising or bleeding.    Neurologic: Denies migraines, seizures, dizziness.  Reports chronic numbness in toes of both feet.  Psychiatric: Denies changes in mood or affect.      /80 (BP Location: Left arm, Patient Position: Chair, Cuff Size: Adult Regular)  Pulse 69  Temp 99.4  F (37.4  C) (Oral)  Resp 16   "Ht 1.676 m (5' 6\")  Wt 57.8 kg (127 lb 8 oz)  SpO2 98%  BMI 20.58 kg/m2    127 lbs 8 oz  5' 6\"   Body mass index is 20.58 kg/(m^2).    Physical Exam  Constitutional: Patient awake, seated upright in a chair, in no apparent distress.  Appears stated age.  Eyes: Pupils equal, round and reactive to light.  Extra ocular muscles intact. Sclera clear.  Conjunctiva normal.  HENT: Head normocephalic.  Oral pharynx intact with moist mucous membranes.  Dentition intact with permanent bridges.  No thyromegaly appreciated.   Respiratory: Lung sounds clear to auscultation bilaterally.  No rales, rhonchi, or wheezing noted.    Cardiovascular: S1, S2, regular rate and rhythm.  No murmurs, rubs, or gallops noted. Radial and pedal pulses palpable, bilaterally.  No edema noted.   GI: Bowel sounds present.  Abdomen rounded, soft, non-tender to light palpation.  No hepatosplenomegaly or masses palpated.   Genitourinary: Exam deferred.  Lymph/Hematologic: No cervical or supraclavicular lymphadenopathy noted.  No excessive bruising noted.    Skin: Color appropriate for race, warm, dry.  No rashes or wounds at anticipated surgical site.   Musculoskeletal: Full extension of the neck.  Brace in place over the right knee.  No redness, warmth, or swelling of the joints noted. Gross motor strength is normal.    Neurologic: Alert, oriented to name, place and time. Cranial nerves II-XII are grossly intact. Gait slightly altered due to the presence of the brace on the right knee.   Neuropsychiatric: Calm, cooperative. Normal affect.     Labs:  18: WBC:  6.2; Hgb: 14.5; Hct: 42.9; Plt: 280  18: Na: 134; K: 4; Cl: 98; Glu: 90; BUN: 15; Cr: 0.65; Ca: 9.8    Imagin18 EKG: Sinus bradycardia    Lab results and EKG were personally reviewed by this provider.        Outside records from [facility] reviewed.    Assessment and Plan  Marita Mata is a 63 year old female scheduled to undergo Right Total Knee Arthroplasty on 18 " with Dr. Lupe Gutierrez.    She has the following specific operative considerations:   1.  Increased risk of postoperative nausea/vomiting: Recommend use of antiemetic agents in the perioperative period.    2.  Asthma: Patient instructed to use QVar and Albuterol inhalers, and take Zyrtec and Singulair as prescribed.  Orders entered for a Duo-Neb to be administered in Pre-Op.  Consider administration of a bronchodilator in the perioperative setting, if needed.    3.  Chronic pain: Recommend careful positioning throughout the perioperative period to prevent injury or exacerbation of pain.    4.  Type & screen, CBC, BMP, UA, MSSA today.    Revised Cardiac Risk Index: 0.4% risk of major adverse cardiac event.  SID risk: Low  PONV risk score= 3.  (If > 2, anti-emetic intervention is recommended.)  Anesthesia considerations: Refer to PAC assessment in the anesthesia records.    Kajal Juarez NP  Preoperative Assessment Center  [facility]  Phone: [phone]  Fax: [phone]

## 2018-08-08 NOTE — PATIENT INSTRUCTIONS
Preparing for Your Surgery      Name:  Marita Mata   MRN:  [id number]   :  1955   Today's Date:  2018     Arriving for surgery:  Surgery date:  28  Arrival time:  12:10 pm    Please come to:   [facility] [address]  [address] [place]  [address]    - parking is available in front of [facility] from 5:15AM to 8:00PM. If you prefer, park your car in the Green Lot.    -Proceed to the 3rd floor, check in at the Adult Surgery Waiting Lounge. [phone]    If an escort is needed stop at the Information Desk in the lobby. Inform the information person that you are here for surgery. An escort to the Adult Surgery Waiting Lounge will be provided.    -  Bring your ID and insurance card.    Enhanced Recovery After Surgery     This is a team effort, including you, to get you back on your feet, eating and drinking normally and out of the hospital as quickly as possible.  The goals are: 1) NO INFECTIONS and   2) RETURN TO NORMAL DIET    How can we achieve these goals?  1) STAY ACTIVE: Walk every day before your surgery; try to increase the amount every day.  Walk after surgery as much as you can-the nurses will help you.  Walking speeds healing and gets you home quicker, you heal better at home and have less risk of infection.     2) INCENTIVE SPIROMETER: Practice your incentive spirometer 4 times per day with 5-10 repetitions each time.  Using the incentive spirometer can strengthen your muscles between your ribs and help you have a strong cough after surgery.  A more effective cough can help prevent problems with your lungs.    3) STAY HYDRATED: Drink clear liquids up until 2 hours before your surgery. We would like you to purchase a drink such as Gatorade or Ensure Clear (not the milkshake type).  Drink this before bedtime and on the way into the hospital, drink between 8-12 ounces or until you feel hydrated.  Keeping well hydrated leads to  your veins being plump, you wake up faster, and you are less likely to be nauseated. Start drinking water as soon as you can after surgery and advance to clear liquids and food as tolerated.  IV fluids contain salt, drinking fluids will minimize the amount of IV fluids you need and decrease the amount of salt you get.    The most common reason for the patient to be readmitted is dehydration. Staying hydrated after you go home from the hospital is very important.  Ensure or Ensure Clear are good options to keep you hydrated.     4) PAIN MANAGEMENT: If we minimize the amount of opioids and narcotics, and use regional blocks (which numb the area where your surgery is) along with oral pain medications; you will have less side effects of nausea and constipation. Narcotics can slow down your bowels and cause you to stay in the hospital longer.     Our goal is to keep you comfortable; eating and drinking normally and back home safely.     What can I eat or drink?  -  You may have solid food or milk products until 8 hours prior to your surgery. (Until 6:40 am )  -  You may have water, apple juice or 7up/Sprite until 2 hours prior to your surgery. (Until 12:10 pm )    Which medicines can I take?       -  Do not bring your own medications to the hospital, except for inhalers.        -  Follow Orthopedic Clinic instructions regarding Ibuprofen. If no instructions given, NO Ibuprofen the day prior to surgery.         -  Hold all Vitamins and Supplements 7 days prior to surgery.        -  Hold Voltaren gel 1 day prior to surgery.    -  Please take these medications the morning of surgery:  Citalopram, QVAR inhaler,       Albuterol inhaler if needed      Acetaminophen (Tylenol) if needed    How do I prepare myself?  -  Take two showers: one the night before surgery; and one the morning of surgery.         Use Scrubcare or Hibiclens to wash from neck down.  You may use your own shampoo and conditioner. No other hair products.   -   Do NOT use lotion, powder, colognes, deodorant, or antiperspirant the day of your surgery.  -  Do NOT wear any makeup, fingernail polish or jewelry.    -  Begin using Incentive Spirometer 1 week prior to surgery.  Use 4 times per day, up to 5-10 breaths each time.  Bring Incentive Spirometer to hospital.    Questions or Concerns:  If you have questions or concerns prior to your surgery, call [phone]. (Mon - Fri   8 am- 5:30 pm)  Questions after surgery, contact your Surgeons office.      AFTER YOUR SURGERY  Breathing exercises   Breathing exercises help you recover faster. Take deep breaths and let the air out slowly. This will:     Help you wake up after surgery.    Help prevent complications like pneumonia.  Preventing complications will help you go home sooner.   Nausea and vomiting   You may feel sick to your stomach after surgery; if so, let your nurse know.    Pain control:  After surgery, you may have pain. Our goal is to help you manage your pain. Pain medicine will help you feel comfortable enough to do activities that will help you heal.  These activities may include breathing exercises, walking and physical therapy.   To help your health care team treat your pain we will ask: 1) If you have pain  2) where it is located 3) describe your pain in your words  Methods of pain control include medications given by mouth, vein or by nerve block for some surgeries.  We may give you a pain control pump that will:  1) Deliver the medicine through a tube placed in your vein  2) Control the amount of medicine you receive  3) Allow you to push a button to deliver a dose of pain medicine  Sequential Compression Device (SCD) or Pneumo Boots:  You may need to wear SCD S on your legs or feet. These are wraps connected to a machine that pumps in air and releases it. The repeated pumping helps prevent blood clots from forming.   Using an Incentive Spirometer    An incentive spirometer is a device that helps you do deep  breathing exercises. These exercises expand your lungs, aid in circulation, and help prevent pneumonia. Deep breathing exercises also help you breathe better and improve the function of your lungs by:    Keeping your lungs clear    Strengthening your breathing muscles    Helping prevent respiratory complications or problems  The incentive spirometer gives you a way to take an active part in your care. A nurse or therapist will teach you breathing exercises. To do these exercises, you will breathe in through your mouth and not your nose. The incentive spirometer only works correctly if you breathe in through your mouth.    Steps to clear lungs  Step 1. Exhale normally. Then, inhale normally.    Relax and breathe out.  Step 2. Place your lips tightly around the mouthpiece.    Make sure the device is upright and not tilted.  Step 3. Inhale as much air as you can through the mouthpiece (don't breath through your nose).    Inhale slowly and deeply.    Hold your breath long enough to keep the balls or disk raised for at least 3 to 5 seconds, or as instructed by your healthcare provider.  Step 4. Repeat the exercise regularly.

## 2018-08-23 ENCOUNTER — HOSPITAL ENCOUNTER (INPATIENT)
Facility: CLINIC | Age: 63
LOS: 2 days | Discharge: HOME | DRG: 470 | End: 2018-08-25
Attending: ORTHOPAEDIC SURGERY | Admitting: ORTHOPAEDIC SURGERY
Payer: COMMERCIAL

## 2018-08-23 ENCOUNTER — ANESTHESIA (OUTPATIENT)
Dept: SURGERY | Facility: CLINIC | Age: 63
DRG: 470 | End: 2018-08-23
Payer: COMMERCIAL

## 2018-08-23 DIAGNOSIS — Z98.890 STATUS POST KNEE SURGERY: Primary | ICD-10-CM

## 2018-08-23 LAB
ABO + RH BLD: NORMAL
ABO + RH BLD: NORMAL
BLD GP AB SCN SERPL QL: NORMAL
BLOOD BANK CMNT PATIENT-IMP: NORMAL
BLOOD BANK CMNT PATIENT-IMP: NORMAL
GLUCOSE BLDC GLUCOMTR-MCNC: 123 MG/DL (ref 70–99)
HGB BLD-MCNC: 15.9 G/DL (ref 11.7–15.7)
INR PPP: 1.04 (ref 0.86–1.14)
SPECIMEN EXP DATE BLD: NORMAL

## 2018-08-23 PROCEDURE — 25000125 ZZHC RX 250: Performed by: ANESTHESIOLOGY

## 2018-08-23 PROCEDURE — 40000170 ZZH STATISTIC PRE-PROCEDURE ASSESSMENT II: Performed by: ORTHOPAEDIC SURGERY

## 2018-08-23 PROCEDURE — 00000146 ZZHCL STATISTIC GLUCOSE BY METER IP

## 2018-08-23 PROCEDURE — 25000128 H RX IP 250 OP 636: Performed by: ORTHOPAEDIC SURGERY

## 2018-08-23 PROCEDURE — 12000001 ZZH R&B MED SURG/OB UMMC

## 2018-08-23 PROCEDURE — 37000009 ZZH ANESTHESIA TECHNICAL FEE, EACH ADDTL 15 MIN: Performed by: ORTHOPAEDIC SURGERY

## 2018-08-23 PROCEDURE — 37000008 ZZH ANESTHESIA TECHNICAL FEE, 1ST 30 MIN: Performed by: ORTHOPAEDIC SURGERY

## 2018-08-23 PROCEDURE — 36000062 ZZH SURGERY LEVEL 4 1ST 30 MIN - UMMC: Performed by: ORTHOPAEDIC SURGERY

## 2018-08-23 PROCEDURE — C1776 JOINT DEVICE (IMPLANTABLE): HCPCS | Performed by: ORTHOPAEDIC SURGERY

## 2018-08-23 PROCEDURE — 71000014 ZZH RECOVERY PHASE 1 LEVEL 2 FIRST HR: Performed by: ORTHOPAEDIC SURGERY

## 2018-08-23 PROCEDURE — 25000128 H RX IP 250 OP 636: Performed by: NURSE ANESTHETIST, CERTIFIED REGISTERED

## 2018-08-23 PROCEDURE — 85610 PROTHROMBIN TIME: CPT | Performed by: ORTHOPAEDIC SURGERY

## 2018-08-23 PROCEDURE — 27210995 ZZH RX 272: Performed by: ORTHOPAEDIC SURGERY

## 2018-08-23 PROCEDURE — 25800025 ZZH RX 258: Performed by: ORTHOPAEDIC SURGERY

## 2018-08-23 PROCEDURE — 36000064 ZZH SURGERY LEVEL 4 EA 15 ADDTL MIN - UMMC: Performed by: ORTHOPAEDIC SURGERY

## 2018-08-23 PROCEDURE — 99207 ZZC CONSULT E&M CHANGED TO SUBSEQUENT LEVEL: CPT | Performed by: INTERNAL MEDICINE

## 2018-08-23 PROCEDURE — 25000125 ZZHC RX 250: Performed by: PHYSICIAN ASSISTANT

## 2018-08-23 PROCEDURE — 25000128 H RX IP 250 OP 636: Performed by: PHYSICIAN ASSISTANT

## 2018-08-23 PROCEDURE — 25000132 ZZH RX MED GY IP 250 OP 250 PS 637: Performed by: INTERNAL MEDICINE

## 2018-08-23 PROCEDURE — 25000132 ZZH RX MED GY IP 250 OP 250 PS 637: Performed by: ORTHOPAEDIC SURGERY

## 2018-08-23 PROCEDURE — 85018 HEMOGLOBIN: CPT | Performed by: ORTHOPAEDIC SURGERY

## 2018-08-23 PROCEDURE — 27810169 ZZH OR IMPLANT GENERAL: Performed by: ORTHOPAEDIC SURGERY

## 2018-08-23 PROCEDURE — 0SRC0J9 REPLACEMENT OF RIGHT KNEE JOINT WITH SYNTHETIC SUBSTITUTE, CEMENTED, OPEN APPROACH: ICD-10-PCS | Performed by: ORTHOPAEDIC SURGERY

## 2018-08-23 PROCEDURE — 25000125 ZZHC RX 250: Performed by: NURSE ANESTHETIST, CERTIFIED REGISTERED

## 2018-08-23 PROCEDURE — 25000132 ZZH RX MED GY IP 250 OP 250 PS 637: Performed by: PHYSICIAN ASSISTANT

## 2018-08-23 PROCEDURE — 25000125 ZZHC RX 250: Performed by: ORTHOPAEDIC SURGERY

## 2018-08-23 PROCEDURE — 27210794 ZZH OR GENERAL SUPPLY STERILE: Performed by: ORTHOPAEDIC SURGERY

## 2018-08-23 PROCEDURE — 99232 SBSQ HOSP IP/OBS MODERATE 35: CPT | Performed by: INTERNAL MEDICINE

## 2018-08-23 PROCEDURE — 36415 COLL VENOUS BLD VENIPUNCTURE: CPT | Performed by: ORTHOPAEDIC SURGERY

## 2018-08-23 DEVICE — IMP BASEPLATE TIBIAL HOWM TRI 4 5520-B-400: Type: IMPLANTABLE DEVICE | Site: KNEE | Status: FUNCTIONAL

## 2018-08-23 DEVICE — IMP INSERT TIBIAL STRK TRI 4X09MM 5531-G-409: Type: IMPLANTABLE DEVICE | Site: KNEE | Status: FUNCTIONAL

## 2018-08-23 DEVICE — BONE CEMENT STRK SIMPLEX P SPEEDSET 6192-1-001: Type: IMPLANTABLE DEVICE | Site: KNEE | Status: FUNCTIONAL

## 2018-08-23 DEVICE — IMP COMP PATELLA TRI 33X9MM 5550-L-339: Type: IMPLANTABLE DEVICE | Site: KNEE | Status: FUNCTIONAL

## 2018-08-23 DEVICE — IMP COMP FEM STRK TRIATHLN CR RT 4 5510-F-402: Type: IMPLANTABLE DEVICE | Site: KNEE | Status: FUNCTIONAL

## 2018-08-23 RX ORDER — ONDANSETRON 2 MG/ML
4 INJECTION INTRAMUSCULAR; INTRAVENOUS EVERY 30 MIN PRN
Status: DISCONTINUED | OUTPATIENT
Start: 2018-08-23 | End: 2018-08-23 | Stop reason: HOSPADM

## 2018-08-23 RX ORDER — CLINDAMYCIN PHOSPHATE 900 MG/50ML
900 INJECTION, SOLUTION INTRAVENOUS SEE ADMIN INSTRUCTIONS
Status: DISCONTINUED | OUTPATIENT
Start: 2018-08-23 | End: 2018-08-23 | Stop reason: HOSPADM

## 2018-08-23 RX ORDER — DEXAMETHASONE SODIUM PHOSPHATE 4 MG/ML
INJECTION, SOLUTION INTRA-ARTICULAR; INTRALESIONAL; INTRAMUSCULAR; INTRAVENOUS; SOFT TISSUE PRN
Status: DISCONTINUED | OUTPATIENT
Start: 2018-08-23 | End: 2018-08-23

## 2018-08-23 RX ORDER — AMOXICILLIN 250 MG
2 CAPSULE ORAL 2 TIMES DAILY
Status: DISCONTINUED | OUTPATIENT
Start: 2018-08-23 | End: 2018-08-25 | Stop reason: HOSPADM

## 2018-08-23 RX ORDER — ONDANSETRON 4 MG/1
4 TABLET, ORALLY DISINTEGRATING ORAL EVERY 30 MIN PRN
Status: DISCONTINUED | OUTPATIENT
Start: 2018-08-23 | End: 2018-08-23 | Stop reason: HOSPADM

## 2018-08-23 RX ORDER — SODIUM CHLORIDE, SODIUM LACTATE, POTASSIUM CHLORIDE, CALCIUM CHLORIDE 600; 310; 30; 20 MG/100ML; MG/100ML; MG/100ML; MG/100ML
INJECTION, SOLUTION INTRAVENOUS CONTINUOUS PRN
Status: DISCONTINUED | OUTPATIENT
Start: 2018-08-23 | End: 2018-08-23

## 2018-08-23 RX ORDER — NALOXONE HYDROCHLORIDE 0.4 MG/ML
.1-.4 INJECTION, SOLUTION INTRAMUSCULAR; INTRAVENOUS; SUBCUTANEOUS
Status: DISCONTINUED | OUTPATIENT
Start: 2018-08-23 | End: 2018-08-23

## 2018-08-23 RX ORDER — ALBUTEROL SULFATE 90 UG/1
2 AEROSOL, METERED RESPIRATORY (INHALATION) 4 TIMES DAILY PRN
Status: DISCONTINUED | OUTPATIENT
Start: 2018-08-23 | End: 2018-08-25 | Stop reason: HOSPADM

## 2018-08-23 RX ORDER — LIDOCAINE 40 MG/G
CREAM TOPICAL
Status: DISCONTINUED | OUTPATIENT
Start: 2018-08-23 | End: 2018-08-25 | Stop reason: HOSPADM

## 2018-08-23 RX ORDER — SODIUM CHLORIDE, SODIUM LACTATE, POTASSIUM CHLORIDE, CALCIUM CHLORIDE 600; 310; 30; 20 MG/100ML; MG/100ML; MG/100ML; MG/100ML
INJECTION, SOLUTION INTRAVENOUS CONTINUOUS
Status: DISCONTINUED | OUTPATIENT
Start: 2018-08-23 | End: 2018-08-25 | Stop reason: HOSPADM

## 2018-08-23 RX ORDER — BIOTIN 1 MG
1000 TABLET ORAL DAILY
Status: DISCONTINUED | OUTPATIENT
Start: 2018-08-23 | End: 2018-08-25 | Stop reason: HOSPADM

## 2018-08-23 RX ORDER — ACETAMINOPHEN 325 MG/1
975 TABLET ORAL ONCE
Status: COMPLETED | OUTPATIENT
Start: 2018-08-23 | End: 2018-08-23

## 2018-08-23 RX ORDER — NALOXONE HYDROCHLORIDE 0.4 MG/ML
.1-.4 INJECTION, SOLUTION INTRAMUSCULAR; INTRAVENOUS; SUBCUTANEOUS
Status: DISCONTINUED | OUTPATIENT
Start: 2018-08-23 | End: 2018-08-25 | Stop reason: HOSPADM

## 2018-08-23 RX ORDER — PROCHLORPERAZINE MALEATE 10 MG
10 TABLET ORAL EVERY 6 HOURS PRN
Status: DISCONTINUED | OUTPATIENT
Start: 2018-08-23 | End: 2018-08-25 | Stop reason: HOSPADM

## 2018-08-23 RX ORDER — PROPOFOL 10 MG/ML
INJECTION, EMULSION INTRAVENOUS PRN
Status: DISCONTINUED | OUTPATIENT
Start: 2018-08-23 | End: 2018-08-23

## 2018-08-23 RX ORDER — CLINDAMYCIN PHOSPHATE 900 MG/50ML
900 INJECTION, SOLUTION INTRAVENOUS EVERY 8 HOURS
Status: COMPLETED | OUTPATIENT
Start: 2018-08-23 | End: 2018-08-24

## 2018-08-23 RX ORDER — CITALOPRAM HYDROBROMIDE 20 MG/1
20 TABLET ORAL DAILY
Status: DISCONTINUED | OUTPATIENT
Start: 2018-08-24 | End: 2018-08-25 | Stop reason: HOSPADM

## 2018-08-23 RX ORDER — BUPIVACAINE HYDROCHLORIDE 5 MG/ML
INJECTION, SOLUTION EPIDURAL; INTRACAUDAL PRN
Status: DISCONTINUED | OUTPATIENT
Start: 2018-08-23 | End: 2018-08-23

## 2018-08-23 RX ORDER — PROPOFOL 10 MG/ML
INJECTION, EMULSION INTRAVENOUS CONTINUOUS PRN
Status: DISCONTINUED | OUTPATIENT
Start: 2018-08-23 | End: 2018-08-23

## 2018-08-23 RX ORDER — ONDANSETRON 2 MG/ML
INJECTION INTRAMUSCULAR; INTRAVENOUS PRN
Status: DISCONTINUED | OUTPATIENT
Start: 2018-08-23 | End: 2018-08-23

## 2018-08-23 RX ORDER — BISACODYL 10 MG
10 SUPPOSITORY, RECTAL RECTAL DAILY
Status: DISCONTINUED | OUTPATIENT
Start: 2018-08-24 | End: 2018-08-25 | Stop reason: HOSPADM

## 2018-08-23 RX ORDER — LIDOCAINE HYDROCHLORIDE 20 MG/ML
INJECTION, SOLUTION INFILTRATION; PERINEURAL PRN
Status: DISCONTINUED | OUTPATIENT
Start: 2018-08-23 | End: 2018-08-23

## 2018-08-23 RX ORDER — HYDROMORPHONE HYDROCHLORIDE 1 MG/ML
.3-.5 INJECTION, SOLUTION INTRAMUSCULAR; INTRAVENOUS; SUBCUTANEOUS EVERY 5 MIN PRN
Status: DISCONTINUED | OUTPATIENT
Start: 2018-08-23 | End: 2018-08-23 | Stop reason: HOSPADM

## 2018-08-23 RX ORDER — SODIUM CHLORIDE, SODIUM LACTATE, POTASSIUM CHLORIDE, CALCIUM CHLORIDE 600; 310; 30; 20 MG/100ML; MG/100ML; MG/100ML; MG/100ML
INJECTION, SOLUTION INTRAVENOUS CONTINUOUS
Status: DISCONTINUED | OUTPATIENT
Start: 2018-08-23 | End: 2018-08-23 | Stop reason: HOSPADM

## 2018-08-23 RX ORDER — ACETAMINOPHEN 325 MG/1
975 TABLET ORAL EVERY 8 HOURS
Status: DISCONTINUED | OUTPATIENT
Start: 2018-08-23 | End: 2018-08-25 | Stop reason: HOSPADM

## 2018-08-23 RX ORDER — KETOROLAC TROMETHAMINE 30 MG/ML
INJECTION, SOLUTION INTRAMUSCULAR; INTRAVENOUS PRN
Status: DISCONTINUED | OUTPATIENT
Start: 2018-08-23 | End: 2018-08-23

## 2018-08-23 RX ORDER — MAGNESIUM HYDROXIDE 1200 MG/15ML
LIQUID ORAL PRN
Status: DISCONTINUED | OUTPATIENT
Start: 2018-08-23 | End: 2018-08-23 | Stop reason: HOSPADM

## 2018-08-23 RX ORDER — AMOXICILLIN 250 MG
1 CAPSULE ORAL 2 TIMES DAILY
Status: DISCONTINUED | OUTPATIENT
Start: 2018-08-23 | End: 2018-08-25 | Stop reason: HOSPADM

## 2018-08-23 RX ORDER — ONDANSETRON 2 MG/ML
4 INJECTION INTRAMUSCULAR; INTRAVENOUS EVERY 6 HOURS PRN
Status: DISCONTINUED | OUTPATIENT
Start: 2018-08-23 | End: 2018-08-25 | Stop reason: HOSPADM

## 2018-08-23 RX ORDER — LIDOCAINE 40 MG/G
CREAM TOPICAL
Status: DISCONTINUED | OUTPATIENT
Start: 2018-08-23 | End: 2018-08-23 | Stop reason: HOSPADM

## 2018-08-23 RX ORDER — ACETAMINOPHEN 325 MG/1
650 TABLET ORAL EVERY 4 HOURS PRN
Status: DISCONTINUED | OUTPATIENT
Start: 2018-08-26 | End: 2018-08-25 | Stop reason: HOSPADM

## 2018-08-23 RX ORDER — ONDANSETRON 4 MG/1
4 TABLET, ORALLY DISINTEGRATING ORAL EVERY 6 HOURS PRN
Status: DISCONTINUED | OUTPATIENT
Start: 2018-08-23 | End: 2018-08-25 | Stop reason: HOSPADM

## 2018-08-23 RX ORDER — WARFARIN SODIUM 5 MG/1
5 TABLET ORAL
Status: COMPLETED | OUTPATIENT
Start: 2018-08-23 | End: 2018-08-23

## 2018-08-23 RX ORDER — MONTELUKAST SODIUM 10 MG/1
10 TABLET ORAL AT BEDTIME
Status: DISCONTINUED | OUTPATIENT
Start: 2018-08-23 | End: 2018-08-25 | Stop reason: HOSPADM

## 2018-08-23 RX ORDER — IPRATROPIUM BROMIDE AND ALBUTEROL SULFATE 2.5; .5 MG/3ML; MG/3ML
3 SOLUTION RESPIRATORY (INHALATION) ONCE
Status: DISCONTINUED | OUTPATIENT
Start: 2018-08-23 | End: 2018-08-23 | Stop reason: HOSPADM

## 2018-08-23 RX ORDER — HYDROMORPHONE HYDROCHLORIDE 1 MG/ML
.3-.5 INJECTION, SOLUTION INTRAMUSCULAR; INTRAVENOUS; SUBCUTANEOUS
Status: DISCONTINUED | OUTPATIENT
Start: 2018-08-23 | End: 2018-08-25 | Stop reason: HOSPADM

## 2018-08-23 RX ORDER — CHLORAL HYDRATE 500 MG
1000 CAPSULE ORAL DAILY
Status: DISCONTINUED | OUTPATIENT
Start: 2018-08-23 | End: 2018-08-25 | Stop reason: HOSPADM

## 2018-08-23 RX ORDER — GABAPENTIN 100 MG/1
300 CAPSULE ORAL
Status: COMPLETED | OUTPATIENT
Start: 2018-08-23 | End: 2018-08-23

## 2018-08-23 RX ORDER — CLINDAMYCIN PHOSPHATE 900 MG/50ML
900 INJECTION, SOLUTION INTRAVENOUS
Status: COMPLETED | OUTPATIENT
Start: 2018-08-23 | End: 2018-08-23

## 2018-08-23 RX ORDER — CETIRIZINE HYDROCHLORIDE 10 MG/1
10 TABLET ORAL AT BEDTIME
Status: DISCONTINUED | OUTPATIENT
Start: 2018-08-23 | End: 2018-08-25 | Stop reason: HOSPADM

## 2018-08-23 RX ORDER — FENTANYL CITRATE 50 UG/ML
25-50 INJECTION, SOLUTION INTRAMUSCULAR; INTRAVENOUS
Status: DISCONTINUED | OUTPATIENT
Start: 2018-08-23 | End: 2018-08-23 | Stop reason: HOSPADM

## 2018-08-23 RX ORDER — OXYCODONE HYDROCHLORIDE 5 MG/1
5-10 TABLET ORAL
Status: DISCONTINUED | OUTPATIENT
Start: 2018-08-23 | End: 2018-08-25 | Stop reason: HOSPADM

## 2018-08-23 RX ADMIN — WARFARIN SODIUM 5 MG: 5 TABLET ORAL at 19:11

## 2018-08-23 RX ADMIN — ACETAMINOPHEN 975 MG: 325 TABLET, FILM COATED ORAL at 17:25

## 2018-08-23 RX ADMIN — BUPIVACAINE HYDROCHLORIDE 3 ML: 5 INJECTION, SOLUTION EPIDURAL; INTRACAUDAL at 10:53

## 2018-08-23 RX ADMIN — SODIUM CHLORIDE, POTASSIUM CHLORIDE, SODIUM LACTATE AND CALCIUM CHLORIDE: 600; 310; 30; 20 INJECTION, SOLUTION INTRAVENOUS at 16:53

## 2018-08-23 RX ADMIN — CLINDAMYCIN IN 5 PERCENT DEXTROSE 900 MG: 18 INJECTION, SOLUTION INTRAVENOUS at 19:09

## 2018-08-23 RX ADMIN — CLINDAMYCIN PHOSPHATE 900 MG: 18 INJECTION, SOLUTION INTRAVENOUS at 11:12

## 2018-08-23 RX ADMIN — KETOROLAC TROMETHAMINE 15 MG: 30 INJECTION, SOLUTION INTRAMUSCULAR at 12:49

## 2018-08-23 RX ADMIN — SENNOSIDES AND DOCUSATE SODIUM 2 TABLET: 8.6; 5 TABLET ORAL at 19:11

## 2018-08-23 RX ADMIN — GABAPENTIN 300 MG: 100 CAPSULE ORAL at 09:11

## 2018-08-23 RX ADMIN — VITAMIN D, TAB 1000IU (100/BT) 5000 UNITS: 25 TAB at 19:11

## 2018-08-23 RX ADMIN — PROPOFOL 75 MCG/KG/MIN: 10 INJECTION, EMULSION INTRAVENOUS at 10:57

## 2018-08-23 RX ADMIN — SODIUM CHLORIDE 1 G: 9 INJECTION, SOLUTION INTRAVENOUS at 11:12

## 2018-08-23 RX ADMIN — MAGNESIUM HYDROXIDE 15 ML: 400 SUSPENSION ORAL at 19:11

## 2018-08-23 RX ADMIN — CETIRIZINE HYDROCHLORIDE 10 MG: 10 TABLET, FILM COATED ORAL at 21:07

## 2018-08-23 RX ADMIN — MIDAZOLAM 2 MG: 1 INJECTION INTRAMUSCULAR; INTRAVENOUS at 10:40

## 2018-08-23 RX ADMIN — MONTELUKAST SODIUM 10 MG: 10 TABLET, FILM COATED ORAL at 21:07

## 2018-08-23 RX ADMIN — OXYCODONE HYDROCHLORIDE 5 MG: 5 TABLET ORAL at 17:25

## 2018-08-23 RX ADMIN — LIDOCAINE HYDROCHLORIDE 60 MG: 20 INJECTION, SOLUTION INFILTRATION; PERINEURAL at 10:54

## 2018-08-23 RX ADMIN — Medication 1000 MG: at 19:10

## 2018-08-23 RX ADMIN — OXYCODONE HYDROCHLORIDE 10 MG: 5 TABLET ORAL at 21:07

## 2018-08-23 RX ADMIN — ACETAMINOPHEN 975 MG: 325 TABLET, FILM COATED ORAL at 09:11

## 2018-08-23 RX ADMIN — ONDANSETRON 4 MG: 2 INJECTION INTRAMUSCULAR; INTRAVENOUS at 13:13

## 2018-08-23 RX ADMIN — PROPOFOL 40 MG: 10 INJECTION, EMULSION INTRAVENOUS at 10:54

## 2018-08-23 RX ADMIN — SODIUM CHLORIDE, POTASSIUM CHLORIDE, SODIUM LACTATE AND CALCIUM CHLORIDE: 600; 310; 30; 20 INJECTION, SOLUTION INTRAVENOUS at 10:43

## 2018-08-23 RX ADMIN — Medication 1000 MCG: at 19:10

## 2018-08-23 RX ADMIN — PROPOFOL 40 MG: 10 INJECTION, EMULSION INTRAVENOUS at 10:57

## 2018-08-23 RX ADMIN — DEXAMETHASONE SODIUM PHOSPHATE 6 MG: 4 INJECTION, SOLUTION INTRAMUSCULAR; INTRAVENOUS at 10:57

## 2018-08-23 ASSESSMENT — ACTIVITIES OF DAILY LIVING (ADL): ADLS_ACUITY_SCORE: 10

## 2018-08-23 NOTE — OP NOTE
PREOPERATIVE DIAGNOSES: Right  Knee Osteoarthritis,            Genu Valgum            BMI 20           Flexion Contracture 10 degree    POSTOPERATIVE DIAGNOSES:  Same       PROCEDURES:     Total knee arthoplasty right        COMPONENTS USED:  [place] [employer] System,   #4 CR femur,#4 tibia, 9mm CS  tibial insert, S33 x 9mm patella       OPERATORS:  Lupe Gutierrez MD     ASSISTANTS: Jean-Paul Lopez md; DENISHA Batista     ANESTHESIA:  Spinal Anesthesia supplemented with Michelle-articular Injection    Exam under anesthesia revealed range of motion 10 degrees to 144 degrees.      OPERATIVE FINDINGS: Grade 1 MFC, grade 1MTP, grade 4  LFC, grade 4 LTP, grade 23 patella, grade1-2 trochlea     DESCRIPTION OF PROCEDURE:  Under Spinal Anesthesia, the patient was positioned supine on the operating room table.    The patient's leg was prepped and draped in usual sterile fashion.  A pause was performed   identifying the correct leg and administration of antibiotics.      A tourniquet, which was on the upper aspect of the patient's leg was elevated   to 250 mmHg after Esmarch exsanguination of the leg.  A midline incision   was used to enter the knee.  A vastus splitting incision was used to enter the joint.    Findings as above.      The procedure began with soft tissue balancing.    We used an intramedullary femoral guide set at 6 degrees of anatomic valgus and making a distal cut of 10 mm.  We then made  the appropriate anterior and posterior cuts to fit a #4 femur.      We then turned our attention to the tibia.  We used an extramedullary guide set at neutral  and made a 6mm cut on the lateral  tibial side.  We used the tensioning device to evaluate balance between the collaterals and between flexion and extension and felt that we has good balance between the collaterals and between flexion and extension.    We made the appropriate  and chamfer cuts on the femur to fit it for a CR femur.     We then returned  to the tibia and fit the tibia to a #4 tibia base plate.      We then turned attention to the patella.  Total thickness was 19-22 mm.  We   removed bone to a 14 mm thickness and put an asymmetric patella button  for resulting thickness of 23 mm.  We did a minimal brigette-retinacular release.  With all components in place, we did a trial  range of motion and felt we had good balance, good tracking of the patella through a passive range of motion.      We then removed the trial prosthesis, brought the permanent prosthesis in the room, pulse lavaged the knee with antibiotic irrigation.  Cemented all components at once in the following order; tibia, femur, patella.  Excess cement was removed both during and after the hardening process.  For the bulk of the hardening process, the leg was held in full extension with a trial insert in place.  Once we verified that the trial insert was the   correct size, we put the permanent prosthesis in place.      We then pulse lavaged the knee again with antibiotic irrigation.  A drain was placed deep to the wound.  The subvastus and superficial vastus fascia was  closed with a running #1 Vicryl, 2-0 Ethibond sutures followed by #1 Vicryl, closed the extensor mechanism proper.  Subcutaneous tissue was closed with 2-0 Vicryl.  The skin was closed with a running Monocryl. Pirneo wound closure system, sterile dressing and a Tubigrip stockinette was put in place.      Tourniquet was let down at the end of the case.  Total tourniquet time was 109 minutes.  The patient tolerated the procedure well and was taken to the recovery room in satisfactory condition.     The patient will be maintained weightbearing as tolerated.  The patient will use a Active Ice and a CPM postoperatively.     At three distinct times during the case the knee was injected with a pre-mixed cocktail of epimorphine, toradol, and ropivicaine.  This was done in the posterior knee before cementing, in the retinacular tissues  while the cement was hardening, and in the subcutaneious space prior to closure.    DENISHA Batista was a necessary component of this case for tissue retraction and limb positioning.  There was a resident learner available for the case, but a second set of hands was necessary  to  help manage the limb in the OR, help with tissue retraction to ensure maximum exposure and maximum surgical efficiency, both which promotes best practice and best surgical outcomes.  A PA was an essential part of this case.        TIFFANY BARRY MD          8/23/2018     Marita Mata   MRN# [id number]                             YOB: 1955

## 2018-08-23 NOTE — ANESTHESIA POSTPROCEDURE EVALUATION
Patient: Marita Mata    Procedure(s):  Right Total Knee Arthroplasty  - Wound Class: I-Clean    Diagnosis:Osteoarthritis Right Knee   Diagnosis Additional Information: No value filed.    Anesthesia Type:  Spinal, MAC    Note:  Anesthesia Post Evaluation    Patient location during evaluation: PACU and Bedside  Patient participation: Able to fully participate in evaluation  Level of consciousness: awake and alert  Pain management: adequate  Airway patency: patent  Cardiovascular status: acceptable  Respiratory status: acceptable  Hydration status: balanced  PONV: none     Anesthetic complications: None          Last vitals:  Vitals:    08/23/18 1344 08/23/18 1345 08/23/18 1400   BP: 106/53 103/56 109/51   Resp: 18 17 17   Temp:   36.6  C (97.8  F)   SpO2: 100% 100% 99%         Electronically Signed By: Renee Arreaga MD  August 23, 2018  2:50 PM

## 2018-08-23 NOTE — ANESTHESIA POSTPROCEDURE EVALUATION
Patient: Marita Mata    Procedure(s):  Right Total Knee Arthroplasty  - Wound Class: I-Clean    Diagnosis:Osteoarthritis Right Knee   Diagnosis Additional Information: No value filed.    Anesthesia Type:  Spinal, MAC    Note:  Anesthesia Post Evaluation    Patient location during evaluation: PACU  Patient participation: Able to fully participate in evaluation  Level of consciousness: awake  Pain management: adequate  Airway patency: patent  Cardiovascular status: acceptable  Respiratory status: acceptable  Hydration status: balanced  PONV: none     Anesthetic complications: None          Last vitals:  Vitals:    08/23/18 1344 08/23/18 1345 08/23/18 1400   BP: 106/53 103/56 109/51   Resp: 18 17 17   Temp:   36.6  C (97.8  F)   SpO2: 100% 100% 99%         Electronically Signed By: Jeffery Doherty MD  August 23, 2018  3:05 PM

## 2018-08-23 NOTE — OR NURSING
PACU to Inpatient Nursing Handoff    Patient Marita Mata is a 63 year old female who speaks English.   Procedure Procedure(s):  Right Total Knee Arthroplasty  - Wound Class: I-Clean   Surgeon(s) Primary: Lupe Gutierrez MD  Assisting: Iain Medel PA-C  Resident - Assisting: Jean-Paul Pereira MD     Allergies   Allergen Reactions     Cephalexin Swelling     Morphine Nausea       Isolation  [unfilled]     Past Medical History   has a past medical history of Anxiety; Chronic pain; History of DVT (deep vein thrombosis); Migraines; Multinodular goiter; and Osteoarthritis.    Anesthesia Spinal   Dermatome Level Dermatomes Left: L3  Dermatomes Right: L3   Preop Meds acetaminophen (Tylenol) - time given: 0911  gabapentin (Neurontin) - time given: 0911   Nerve block Not applicable   Intraop Meds dexamethasone (Decadron)  ketorolac (Toradol): last given at 1243  ondansetron (Zofran): last given at 1313   Local Meds Yes - Local Cocktail (morphine, ropivacaine, epinephrine, Toradol)   Antibiotics clindamycin (Cleocin) - last given at 1112     Pain Patient Currently in Pain: denies  Comfort: tolerable with discomfort  Pain Control: inadequate pain control   PACU meds  Not applicable   PCA / epidural Yes. Epidural - bupivacaine   Capnography     Telemetry ECG Rhythm: Normal sinus rhythm   Inpatient Telemetry Monitor Ordered? No        Labs Glucose Lab Results   Component Value Date    GLC 90 08/08/2018       Hgb Lab Results   Component Value Date    HGB 15.9 08/23/2018       INR No results found for: INR   PACU Imaging Not applicable     Wound/Incision Incision/Surgical Site 08/23/18 Right Knee (Active)   Incision Assessment UTV 8/23/2018  3:30 PM   Closure Liquid bandage 8/23/2018  1:45 PM   Dressing Intervention Clean, dry, intact 8/23/2018  3:30 PM   Number of days:0      CMS Peripheral Neurovascular WDL: WDL (08/23/18 1530)  All Extremities Temperature: warm (08/23/18 1530)  All Extremities  Color: no discoloration (08/23/18 1530)  All Extremities Sensation: numbness present (08/23/18 1530)  RLE Temperature: warm (08/23/18 1530)  RLE Color: no discoloration (08/23/18 1530)  RLE Sensation: numbness present (08/23/18 1530)   Equipment ice pack   Other LDA Airway - Adult/Peds (Active)   Number of days:0        IV Access Peripheral IV Left Hand (Active)   Site Assessment [facility] 8/23/2018  3:30 PM   Line Status Infusing 8/23/2018  3:30 PM   Phlebitis Scale 0-->no symptoms 8/23/2018  3:30 PM   Infiltration Scale 0 8/23/2018  3:30 PM   Number of days:       Peripheral IV 08/23/18 Right Lower forearm (Active)   Site Assessment [facility] 8/23/2018  3:30 PM   Line Status Saline locked 8/23/2018  3:30 PM   Phlebitis Scale 0-->no symptoms 8/23/2018  3:30 PM   Infiltration Scale 0 8/23/2018  3:30 PM   Number of days:0      Blood Products Not applicable EBL 50 mL   Intake/Output Date 08/23/18 0700 - 08/24/18 0659   Shift 2161-6220 9112-1616 4990-6554 24 Hour Total   I  N  T  A  K  E   P.O. 200 100  300    I.V. 800   800    Shift Total  (mL/kg) 1000  (17.79) 100  (1.78)  1100  (19.57)   O  U  T  P  U  T   Drains 30 20  50    Blood 25   25    Shift Total  (mL/kg) 55  (0.98) 20  (0.36)  75  (1.33)   Weight (kg) 56.2 56.2 56.2 56.2        Drains / Gandhi Closed/Suction Drain Right Knee [employer] 10 Green[place]ic (Active)   Site Description UTV 8/23/2018  3:30 PM   Dressing Status Normal: Clean, Dry & Intact 8/23/2018  3:30 PM   Status To bulb suction 8/23/2018  2:30 PM   Output (ml) 20 ml 8/23/2018  3:30 PM   Number of days:0      Time of void PreOp Void Prior to Procedure: 0830 (08/23/18 0854)    PostOp      Diapered? No   Bladder Scan      mL (08/23/18 1530)  tolerating sips     Vitals    B/P: 106/59  T: 97.8  F (36.6  C)    Temp src: Axillary  P:       Heart Rate: 80 (08/23/18 1445)     R: 14  O2:  SpO2: 99 %    O2 Device: None (Room air) (08/23/18 1530)    Oxygen Delivery: 7 LPM (08/23/18 1330)         Family/support present  significant other   Patient belongings Patient Belongings: clothing;shoes  Disposition of Belongings: Other (see comment) (in storage closet on unit)   Patient transported on cart   DC meds/scripts (obs/outpt) Not applicable   Inpatient Pain Meds Released? No       Special needs/considerations None   Tasks needing completion None       Cierra Yousif, RADHA  ASCOM [id number]

## 2018-08-23 NOTE — CONSULTS
HOSPITALIST INITIAL CONSULT NOTE    Referring Provider:  Lupe Gutierrez MD      Reason for Consult     Hx of Asthma, Post operative managment    History of Present Illness     Marita Mata is 63 year old year old female with hx of Asthma, Chronic pain, Hx of DVT, Multinodular goiter is being admitted s/p Right TKA on 08/23/2018    EBL 25 ml. ml     Currently, reports pain is controlled. Denies any chest pain, shortness of breath. Denies any nausea, vomiting. No fever, chill          Past Medical History     Past Medical History:   Diagnosis Date     Anxiety      Chronic pain      History of DVT (deep vein thrombosis)      Migraines      Multinodular goiter      Osteoarthritis           Past Surgical History     Past Surgical History:   Procedure Laterality Date     C ORAL SURGERY PROCEDURE      Upper jaw surgery     CYSTOSCOPY       ORTHOPEDIC SURGERY      Toe     ORTHOPEDIC SURGERY      Foot     SEPTOPLASTY       SPINE SURGERY      Cervical fusion     TONSILLECTOMY            Medications     All his current medications are reviewed in current medication section of [facility].  Home medications are reviewed.  Current Facility-Administered Medications   Medication     [START ON 8/26/2018] acetaminophen (TYLENOL) tablet 650 mg     acetaminophen (TYLENOL) tablet 975 mg     bacitracin 50,000 Units, gentamicin (GARAMYCIN) 120 mg, polymyxin B 500,000 Units in sodium chloride 0.9% (bag) 1,000 mL Bag for irrigation     fentaNYL (PF) (SUBLIMAZE) injection 25-50 mcg     HYDROmorphone (PF) (DILAUDID) injection 0.3-0.5 mg     HYDROmorphone (PF) (DILAUDID) injection 0.3-0.5 mg     lactated ringers infusion     magnesium hydroxide (MILK OF MAGNESIA) suspension 15 mL     naloxone (NARCAN) injection 0.1-0.4 mg     ondansetron (ZOFRAN-ODT) ODT tab 4 mg    Or     ondansetron (ZOFRAN) injection 4 mg     ondansetron (ZOFRAN-ODT) ODT tab 4 mg    Or     ondansetron (ZOFRAN) injection 4 mg     oxyCODONE IR (ROXICODONE) tablet  5-10 mg     prochlorperazine (COMPAZINE) injection 10 mg    Or     prochlorperazine (COMPAZINE) tablet 10 mg     prochlorperazine (COMPAZINE) injection 10 mg     ropivacaine 200 mg, ketorolac 15 mg, EPINEPHrine 0.3 mg, morphine 2.5 mg in saline 50 mL (TV)     senna-docusate (SENOKOT-S;PERICOLACE) 8.6-50 MG per tablet 1 tablet    Or     senna-docusate (SENOKOT-S;PERICOLACE) 8.6-50 MG per tablet 2 tablet     sodium chloride 0.9% (bottle) irrigation     Facility-Administered Medications Ordered in Other Encounters   Medication     dexamethasone (DECADRON) injection 10 mg        Allergies        Allergies   Allergen Reactions     Cephalexin Swelling     Morphine Nausea        Family History     Family History   Problem Relation Age of Onset     Dementia Mother      LUNG DISEASE Father      Exposure to coal, etc     Prostate Cancer Father      Connective Tissue Disorder Sister      Allergy (Severe) Brother      No Known Problems Maternal Grandmother      Medical History Unknown Maternal Grandfather      Dementia Paternal Grandmother      Other - See Comments Sister      Fibromyalgia     No Known Problems Sister      No Known Problems Sister      No Known Problems Sister      Medical History Unknown Paternal Grandfather           Social History     Social History     Social History     Marital status:      Spouse name: N/A     Number of children: N/A     Years of education: N/A     Occupational History     Not on file.     Social History Main Topics     Smoking status: Former Smoker     Packs/day: 1.00     Years: 3.00     Types: Cigarettes     Quit date: 1982     Smokeless tobacco: Never Used     Alcohol use Yes      Comment: 1-2 beers a night     Drug use: No     Sexual activity: Not on file     Other Topics Concern     Not on file     Social History Narrative        Review of Systems   A 10 point review of systems was taken and largely negative except for that which was stated above.      Physical Exam  "      Vital signs:    Blood pressure 106/59, temperature 97.8  F (36.6  C), temperature source Axillary, resp. rate 14, height 1.676 m (5' 6\"), weight 56.2 kg (123 lb 14.4 oz), SpO2 99 %.  Estimated body mass index is 20 kg/(m^2) as calculated from the following:    Height as of this encounter: 1.676 m (5' 6\").    Weight as of this encounter: 56.2 kg (123 lb 14.4 oz).      Intake/Output Summary (Last 24 hours) at 08/23/18 1600  Last data filed at 08/23/18 1530   Gross per 24 hour   Intake             1100 ml   Output               75 ml   Net             1025 ml      HEENT: No icterus, no pallor  Cardiovascular: S1, S2 normal  Respiratory: B/L CTA  Abdomen: Soft, NT, BS+  Neurology: Alert, awake, and oriented.   Extremities: Right knee dressing in place.        Laboratory and Imaging Studies     Laboratory and Imaging studies reviewed in the results review section of Epic. Pertinent studies are as below:    CMPNo lab results found in last 7 days.  CBC  Recent Labs  Lab 08/23/18  0859   HGB 15.9*     INRNo lab results found in last 7 days.  Arterial Blood GasNo lab results found in last 7 days.       Impression/Recommendations       63 year old year old female with hx of Asthma, Chronic pain, Hx of DVT, Multinodular goiter is being admitted s/p Right TKA on 08/23/2018  # S/P  Right TKA on 08/23/2018:  Management primarily per Ortho team.  - Wound cares, Dressings, Surgical pain management, DVT Prophylaxis  per primary team.   - Monitor anemia, hemodynamics, Input/Output  - Encourage Incentive spirometry  - Laxatives for constipation prophylaxis  - Hg in AM    # Hx of Rt lower extremity DVT in August 2017: Treated for 3 months. Thought to be due to hormone replacements.   # Hx of Multinodular goiter: Not on any medications.   # Hx of Asthma: PTA on Albuterol PRN, Beclomethasone daily. Stable currently  - Continue home regimen  # Hx of Allergies: PTA on Cetrizine, Montelukast sodium. Stable  Currently  - Continue   # " Hx of Depression/Insomnia: PTA on Citalopram, Trazodone PRN  - Continue  # Nutrition/Vitamin D supplementation: On Cholecalciferol, Riboflavin, Omega 3 fatty acid, Biotin. Continue           Guido Borja  Internal Medicine/Hospitalist  [facility]  [phone]

## 2018-08-23 NOTE — ANESTHESIA CARE TRANSFER NOTE
Patient: Marita Mata    Procedure(s):  Right Total Knee Arthroplasty  - Wound Class: I-Clean    Diagnosis: Osteoarthritis Right Knee   Diagnosis Additional Information: No value filed.    Anesthesia Type:   Spinal, MAC     Note:  Airway :Face Mask  Patient transferred to:PACU  Comments: Spont respirations, no s/s resp distress. VSS. A& O x 2, verbalizing no pain. Transported to PACU with 5L FMO2. Report to RN.Handoff Report: Identifed the Patient, Identified the Reponsible Provider, Reviewed the pertinent medical history, Discussed the surgical course, Reviewed Intra-OP anesthesia mangement and issues during anesthesia, Set expectations for post-procedure period and Allowed opportunity for questions and acknowledgement of understanding      Vitals: (Last set prior to Anesthesia Care Transfer)    CRNA VITALS  8/23/2018 1256 - 8/23/2018 1330      8/23/2018             Resp Rate (observed): (!)  1                Electronically Signed By: JOSEPH Pike CRNA  August 23, 2018  1:30 PM

## 2018-08-23 NOTE — PHARMACY-ANTICOAGULATION SERVICE
Clinical Pharmacy - Warfarin Dosing Consult     Pharmacy has been consulted to manage this patient s warfarin therapy.  Indication: DVT/PE Prophylaxis  Therapy Goal: INR 2-3  Provider/Team: Ortho  Warfarin Prior to Admission: No  Significant drug interactions: none significant    INR   Date Value Ref Range Status   08/23/2018 1.04 0.86 - 1.14 Final     Recommend warfarin 5 mg today.  Pharmacy will monitor Marita Mata daily and order warfarin doses to achieve specified goal.      Please contact pharmacy as soon as possible if the warfarin needs to be held for a procedure or if the warfarin goals change.      Destiny Javier, PharmD, BCPS

## 2018-08-23 NOTE — ANESTHESIA PROCEDURE NOTES
Spinal/LP Procedure Note    Spinal Block  Staff:     Anesthesiologist:  KAYLYN VARGAS  Location: OB  Procedure Start/Stop Times:      8/23/2018 10:47 AM     8/23/2018 10:53 AM    patient identified, IV checked, site marked, risks and benefits discussed, informed consent, monitors and equipment checked, pre-op evaluation, at physician/surgeon's request and post-op pain management      Correct Patient: Yes      Correct Position: Yes      Correct Site: Yes      Correct Procedure: Yes      Correct Laterality:  Yes    Site Marked:  Yes  Procedure:     Procedure:  Intrathecal    Position:  Sitting    Sterile Prep: chloraprep, mask and sterile gloves      Insertion site:  L3-4    Approach:  Midline    Needle Type:  Aide    Needle gauge (G):  25    Local Skin Infiltration:  2% lidocaine    amount (ml):  3    Introducer used: Yes      Introducer gauge:  20 G    Attempts:  1    Redirects:  0    CSF:  Clear    Paresthesias:  No

## 2018-08-23 NOTE — PROVIDER NOTIFICATION
Pt can walk and do own ADL's, but is limited by pain.  Patient sometimes cannot walk at all it just depends on the day.

## 2018-08-23 NOTE — LETTER
"Transition Communication Hand-off for Care Transitions to Next Level of Care Provider    Name: Marita Mata  : 1955  MRN #: [id number]  Primary Care Provider: Laury Heredia     Primary Clinic: [facility] [address]  [place] [address]     Reason for Hospitalization:  Status post knee surgery [Z98.890]  Admit Date/Time: 2018  8:12 AM  Discharge Date: 2018  Payor Source: Payor: [employer] / Plan: [employer]ERAL / Product Type: HMO /          Reason for Communication Hand-off Referral: Avoidable readmission within 30 days    Discharge Plan: Home with outpatient PT  [facility]  [address] [place], [place] [id number] [phone]    Discharge Needs Assessment:  Needs       Most Recent Value    Equipment Currently Used at Home none    Transportation Available car        Follow-up specialty is recommended: Yes    Follow-up plan:  Future Appointments  Date Time Provider Department Center   2018 2:30 PM Madisyn Veloz Pt, PT [facility] [place]   2018 9:30 AM Cullen Castillo, PT [facility] [place]   2018 1:30 PM Cullen Castillo, PT [facility] [place]       Any outstanding tests or procedures:        Referrals     Future Labs/Procedures    Physical Therapy Referral     Comments:    [facility]   [address] [place]  [place] [address]  Phone [phone]  Fax [phone]    Treatment: Evaluation & Treatment  Special Instructions/Modalities: None  Special Programs: None    Please be aware that coverage of these services is subject to the terms and limitations of your health insurance plan.  Call member services at your health plan with any benefit or coverage questions.      **Note to Provider:  If you are referring outside of [facility] for the therapy appointment, please list the name of the location in the \"special instructions\" above, print the referral and give to the patient to schedule the appointment.            Magda Moreau RN, BSN  Care Coordinator, " 8A  Phone [phone]  Pager [phone]      AVS/Discharge Summary is the source of truth; this is a helpful guide for improved communication of patient story

## 2018-08-23 NOTE — BRIEF OP NOTE
Orthopaedic Surgery Brief Op-Note      Patient: Marita Mata  : 1955  Date of Service: 2018 1:44 PM    Pre-operative Diagnosis: Osteoarthritis Right Knee   Post-operative Diagnosis: same    Procedure(s) Performed: Procedure(s):  Right Total Knee Arthroplasty  - Wound Class: I-Clean    Staff: Dr. Gutierrez  Assistants:   Jean-Paul Medel PAJudyC    Anesthesia: General  EBL: 25 cc  UOP: see anesthesia record  Tourniquet Time: 109 minutes at 250 mmHg    Implants:     Implant Name Type Inv. Item Serial No.  Lot No. LRB No. Used   BONE CEMENT RADIOPAQUE SIMPLEX P SPEEDSET 6192-1-001 Cement, Bone BONE CEMENT RADIOPAQUE SIMPLEX P SPEEDSET 6192-1-001  [employer] [id number] Right 1   IMP COMP PATELLA TRI 33X9MM Total Joint Component/Insert IMP COMP PATELLA TRI 33X9MM  [employer] [id number] Right 1   IMP COMP FEM STRK TRIATHLN CR RT 4 5510-F-402 Total Joint Component/Insert IMP COMP FEM STRK TRIATHLN CR RT 4 5510-F-402  [employer] [id number] Right 1   IMP BASEPLATE TIBIAL HOWM TRI 4 5520-B-400 Total Joint Component/Insert IMP BASEPLATE TIBIAL HOWM TRI 4 5520-B-400  [employer] [id number] Right 1   IMP INSERT TIBIAL STRK TRI 4X09MM 5531-G-409 Total Joint Component/Insert IMP INSERT TIBIAL STRK TRI 4X09MM 5531-G-409   [employer] [facility] [id number] Right 1     Drains: hemovac  Intra-op Labs/Cxs/Specimens: None  Complications: No apparent complications during procedure  Findings: Please see dictated operative note for details    Disposition: Stable to PACU, then admit to Orthopaedics.    Post-Op Plan:  Assessment/Plan: Marita Mata is a 63 year old female s/p Procedure(s):  Right Total Knee Arthroplasty  - Wound Class: I-Clean on 2018 with Dr. Gutierrez.    Activity: Up with assist and assistive devices as needed until independent. Knee ROM as tolerated. Advance CPM as tolerated to goal of 0 to 90 degrees.  Weight bearing status: WBAT    Antibiotics: Clindamycin  x 24 hours   Diet: Begin with clear fluids and progress diet as tolerated. Bowel regimen. Anti-emetics PRN.    DVT prophylaxis:  Mechanical while in hospital with Warfarin starting POD 0 (Pharmacy to dose) x 4 weeks  Elevation: Elevate heels off of bed on pillows, no pillows behind the knee at any time    Wound Care: Dressing change at bedside by Ortho on POD #1  Drains: Document output per shift, Ortho will discontinue on POD #1-2.    Pain management: Orals PRN, IV for breakthrough only  X-rays: AP/Lat R knee XR on POD #1 or immediately before discharge, whichever comes first  Physical Therapy: Mobilization, ROM, ADL's  Occupational Therapy: ADL's  Labs: Trend Hgb on PODs #1 & 2  Cultures: None  Consults: PT, OT. Hospitalist, appreciate assistance in caring for this patient throughout the perioperative period    Future Appointments  Date Time Provider Department Center   8/24/2018 8:30 AM Shantel Lara PT [facility] [place]   8/24/2018 9:30 AM Parag Cummings OT [facility] [place]   8/24/2018 1:30 PM Shantel Lara PT [facility]       Disposition: Pending progress with therapies, pain control on orals, and medical stability, anticipate discharge to Home vs TCU on POD #2-3.    I positioned and prepped the patient. I placed and held retractors to provide adequate exposure that allowed the case to proceed in a safe, efficient manner. I assisted in identifying and protecting important structures. I ligated blood vessels to maintain hemostasis and minimize bleeding risk. I suctioned fluids when needed. I assisted with the proper selection and positioning of any implants required for the case. I performed wound closure of the operative incisions.    An experienced physician assistant was medically necessary to ensure a safe and efficient procedure. The assistance that I provided decreased operative time and thereby, reduced the risk of infection and complications from prolonged time of anesthesia. My assistance was  vital in achieving best practices.      Iain Medel PA-C 8/23/2018 1:44 PM  Orthopaedic Surgery     Please page me at [phone] with any questions/concerns during regular weekday hours before 5pm. If there is no response, if it is a weekend, or if it is during evening hours then please page the orthopaedic surgery resident on call.

## 2018-08-23 NOTE — IP AVS SNAPSHOT
MRN:[id number]                      After Visit Summary   8/23/2018    Marita Edward    MRN: [id number]           Thank you!     Thank you for choosing [facility] for your care. Our goal is always to provide you with excellent care. Hearing back from our patients is one way we can continue to improve our services. Please take a few minutes to complete the written survey that you may receive in the mail after you visit with us. Thank you!        Patient Information     Date Of Birth          1955        Designated Caregiver       Most Recent Value    Caregiver    Will someone help with your care after discharge? yes    Name of designated caregiver Miles edward    Phone number of caregiver [phone]    Caregiver address Same address as pt      About your hospital stay     You were admitted on:  August 23, 2018 You last received care in the:  [facility]    You were discharged on:  August 25, 2018        Reason for your hospital stay       Right knee replacement                  Who to Call     For medical emergencies, please call 911.  For non-urgent questions about your medical care, please call your primary care provider or clinic, [phone]  For questions related to your surgery, please call your surgery clinic        Attending Provider     Provider Specialty    Lupe Gutierrez MD Orthopedics       Primary Care Provider Office Phone # Fax #    Laury Heredia PA-C [phone]      After Care Instructions     Activity       Your activity upon discharge: as tolerated with assistive device            Diet       Follow this diet upon discharge: regular            Discharge Instructions       -Wound Care: Your incision was closed with sutures underneath the skin and glue. Keep dressing and wrap in place for 1 week. Ok to shower on the third day after surgery. Change the dressing after the shower. You may also note strands of suture from each end of your incision - this is normal  and is a knotless type of suture that can be trimmed at its base around 2 weeks from your surgery, otherwise it may be left to fall off on its own. Water may run over incision but no scrubbing. Do not submerge wound in water (pool, spa, sauna, lake, bathtub, etc.) for at least 4 weeks.     -Pain control: Take medication as prescribed, but only as often as necessary. Do not drive or drink alcohol while you are taking pain medication. Remember that Tylenol can be used for pain relief as well, as you wean off the narcotics. Do not exceed 4,000 mg of tylenol in 24 hours.     -Activity: Weight bear as tolerated right lower extremity    -DVT prophylaxis: Xarelto for 4 weeks    -If: Pain in your surgical area persists or worsens in the first few days after surgery. Excessive redness or drainage of cloudy or bloody material from the wounds (clear red tinted fluid and some mild drainage should be expected). Drainage of any kind > one week after surgery should be reported to the doctor. You have a temperature elevation greater than 101.5  You have pain, swelling or redness in your calf. You have numbness or weakness in your leg or foot. Call our clinic ([phone] during regular office hours, or [phone] for the on-call resident MD for questions - RESIDENT DOES NOT HAVE ABILITY TO PRESCRIBE NARCOTICS)     -Call your primary doctor or seek medical care if you have any of the following: temperature greater than 101.4, chest pain, increased shortness of breath, nausea or vomiting.                  Follow-up Appointments     Adult [facility] Follow-up and recommended labs and tests       Follow up as previously scheduled on 9/5 for wound check and 9/26 to see Dr. Gutierrez     Appointments on [place] and/or [facility] (with [facility] provider or service). Call [phone] if you haven't heard regarding these appointments within 7 days of discharge.                  Additional Services     Physical Therapy Referral     "   [facility]   [address], [facility] [address]  Phone [phone]  Fax [phone]    Treatment: Evaluation & Treatment  Special Instructions/Modalities: None  Special Programs: None    Please be aware that coverage of these services is subject to the terms and limitations of your health insurance plan.  Call member services at your health plan with any benefit or coverage questions.      **Note to Provider:  If you are referring outside of [facility] for the therapy appointment, please list the name of the location in the \"special instructions\" above, print the referral and give to the patient to schedule the appointment.                  Future tests that were ordered for you     Assign Questionnaire Series to Patient                 Pending Results     No orders found from 8/21/2018 to 8/24/2018.            Statement of Approval     Ordered          08/25/18 0809  I have reviewed and agree with all the recommendations and orders detailed in this document.  EFFECTIVE NOW     Approved and electronically signed by:  Jean-Paul Pereira MD             Admission Information     Date & Time Provider Department Dept. Phone    8/23/2018 Lupe Gutierrez MD [facility] [phone]      Your Vitals Were     Blood Pressure Pulse Temperature Respirations Height Weight    108/42 (BP Location: Left arm) 68 98.6  F (37  C) (Oral) 16 1.676 m (5' 6\") 56.2 kg (123 lb 14.4 oz)    Pulse Oximetry BMI (Body Mass Index)                99% 20 kg/m2          MyChart Information     [facility] gives you secure access to your electronic health record. If you see a primary care provider, you can also send messages to your care team and make appointments. If you have questions, please call your primary care clinic.  If you do not have a primary care provider, please call [phone] and they will assist you.        Care EveryWhere ID     This is your Care EveryWhere ID. This could be used by other organizations to access your " [facility] medical records  [id number]        Equal Access to Services     ANDREW MICHELLE : Hadii aad sony norberthoang Soamrik, wapadminida luqadaha, qaybta kaalmaadam frostneeladam, matty cevallosmagedsanty haque. So [facility] [phone].    ATENCIÓN: Si habla español, tiene a flowers disposición servicios gratuitos de asistencia lingüística. Llame al [phone].    We comply with applicable federal civil rights laws and [place] laws. We do not discriminate on the basis of race, color, national origin, age, disability, sex, sexual orientation, or gender identity.               Review of your medicines      START taking        Dose / Directions    acetaminophen 325 MG tablet   Commonly known as:  TYLENOL        Dose:  975 mg   Take 3 tablets (975 mg) by mouth every 8 hours   Quantity:  100 tablet   Refills:  0       oxyCODONE IR 5 MG tablet   Commonly known as:  ROXICODONE        Dose:  5-10 mg   Take 1-2 tablets (5-10 mg) by mouth every 3 hours as needed for other (pain control or improvement in physical function. Hold dose for analgesic side effects.)   Quantity:  70 tablet   Refills:  0       rivaroxaban ANTICOAGULANT 10 MG Tabs tablet   Commonly known as:  XARELTO        Dose:  10 mg   Take 1 tablet (10 mg) by mouth daily (with dinner) for 28 days   Quantity:  28 tablet   Refills:  0       senna-docusate 8.6-50 MG per tablet   Commonly known as:  SENOKOT-S;PERICOLACE        Dose:  2 tablet   Take 2 tablets by mouth 2 times daily   Quantity:  60 tablet   Refills:  1         CONTINUE these medicines which have NOT CHANGED        Dose / Directions    biotin 1000 MCG Tabs tablet        Dose:  1000 mcg   Take 1,000 mcg by mouth daily   Refills:  0       CITALOPRAM HYDROBROMIDE PO        Dose:  20 mg   Take 20 mg by mouth daily   Refills:  0       diclofenac 1 % Gel topical gel   Commonly known as:  VOLTAREN        Place onto the skin nightly as needed for moderate pain   Refills:  0       MAGNESIUM OXIDE PO        Dose:  500 mg    Take 500 mg by mouth daily   Refills:  0       OMEGA 3 PO        Dose:  1000 mg   Take 1,000 mg by mouth daily   Refills:  0       Pseudoephedrine-Ibuprofen  MG Tabs        Dose:  1 tablet   Take 1 tablet by mouth daily   Refills:  0       QVAR IN        Dose:  1 puff   Inhale 1 puff into the lungs 2 times daily   Refills:  0       SINGULAIR PO        Dose:  10 mg   Take 10 mg by mouth At Bedtime   Refills:  0       TRAZODONE HCL PO        Dose:  25 mg   Take 25 mg by mouth nightly as needed for sleep   Refills:  0       UNABLE TO FIND        Supplement BCQ 2 tablets in AM   Refills:  0       UNABLE TO FIND        Supplement Vital Protein Powder 2 scoops in AM   Refills:  0       VENTOLIN IN        Dose:  2 puff   Inhale 2 puffs into the lungs every 4 hours as needed   Refills:  0       VITAMIN B-2 PO        Dose:  400 mg   Take 400 mg by mouth daily   Refills:  0       VITAMIN D-3 PO        Dose:  5000 Units   Take 5,000 Units by mouth daily   Refills:  0       ZYRTEC ALLERGY PO        Dose:  10 mg   Take 10 mg by mouth At Bedtime   Refills:  0            Where to get your medicines      These medications were sent to [facility] [place], [place] - [address]  [address] [address]     Phone:  [phone]     acetaminophen 325 MG tablet    rivaroxaban ANTICOAGULANT 10 MG Tabs tablet    senna-docusate 8.6-50 MG per tablet         Some of these will need a paper prescription and others can be bought over the counter. Ask your nurse if you have questions.     Bring a paper prescription for each of these medications     oxyCODONE IR 5 MG tablet                Protect others around you: Learn how to safely use, store and throw away your medicines at www.disposemymeds.org.        Information about OPIOIDS     PRESCRIPTION OPIOIDS: WHAT YOU NEED TO KNOW   We gave you an opioid (narcotic) pain medicine. It is important to manage your pain, but opioids are not always the  best choice. You should first try all the other options your care team gave you. Take this medicine for as short a time (and as few doses) as possible.    Some activities can increase your pain, such as bandage changes or therapy sessions. It may help to take your pain medicine 30 to 60 minutes before these activities. Reduce your stress by getting enough sleep, working on hobbies you enjoy and practicing relaxation or meditation. Talk to your care team about ways to manage your pain beyond prescription opioids.    These medicines have risks:    DO NOT drive when on new or higher doses of pain medicine. These medicines can affect your alertness and reaction times, and you could be arrested for driving under the influence (DUI). If you need to use opioids long-term, talk to your care team about driving.    DO NOT operate heavy machinery    DO NOT do any other dangerous activities while taking these medicines.    DO NOT drink any alcohol while taking these medicines.     If the opioid prescribed includes acetaminophen, DO NOT take with any other medicines that contain acetaminophen. Read all labels carefully. Look for the word  acetaminophen  or  Tylenol.  Ask your pharmacist if you have questions or are unsure.    You can get addicted to pain medicines, especially if you have a history of addiction (chemical, alcohol or substance dependence). Talk to your care team about ways to reduce this risk.    All opioids tend to cause constipation. Drink plenty of water and eat foods that have a lot of fiber, such as fruits, vegetables, prune juice, apple juice and high-fiber cereal. Take a laxative (Miralax, milk of magnesia, Colace, Senna) if you don t move your bowels at least every other day. Other side effects include upset stomach, sleepiness, dizziness, throwing up, tolerance (needing more of the medicine to have the same effect), physical dependence and slowed breathing.    Store your pills in a secure place, locked if  possible. We will not replace any lost or stolen medicine. If you don t finish your medicine, please throw away (dispose) as directed by your pharmacist. The [place] Pollution Control [employer] has more information about safe disposal: https://www.pca.state.[place]/living-green/managing-unwanted-medications             Medication List: This is a list of all your medications and when to take them. Check marks below indicate your daily home schedule. Keep this list as a reference.      Medications           Morning Afternoon Evening Bedtime As Needed    acetaminophen 325 MG tablet   Commonly known as:  TYLENOL   Take 3 tablets (975 mg) by mouth every 8 hours   Last time this was given:  975 mg on 8/25/2018  9:17 AM                                biotin 1000 MCG Tabs tablet   Take 1,000 mcg by mouth daily   Last time this was given:  1,000 mcg on 8/25/2018  7:51 AM                                CITALOPRAM HYDROBROMIDE PO   Take 20 mg by mouth daily   Last time this was given:  20 mg on 8/25/2018  7:51 AM                                diclofenac 1 % Gel topical gel   Commonly known as:  VOLTAREN   Place onto the skin nightly as needed for moderate pain                                MAGNESIUM OXIDE PO   Take 500 mg by mouth daily                                OMEGA 3 PO   Take 1,000 mg by mouth daily   Last time this was given:  1,000 mg on 8/25/2018  7:51 AM                                oxyCODONE IR 5 MG tablet   Commonly known as:  ROXICODONE   Take 1-2 tablets (5-10 mg) by mouth every 3 hours as needed for other (pain control or improvement in physical function. Hold dose for analgesic side effects.)   Last time this was given:  5 mg on 8/25/2018  2:05 PM                                Pseudoephedrine-Ibuprofen  MG Tabs   Take 1 tablet by mouth daily                                QVAR IN   Inhale 1 puff into the lungs 2 times daily   Last time this was given:  1 puff on 8/25/2018  7:55 AM                                 rivaroxaban ANTICOAGULANT 10 MG Tabs tablet   Commonly known as:  XARELTO   Take 1 tablet (10 mg) by mouth daily (with dinner) for 28 days   Last time this was given:  10 mg on 8/24/2018  4:17 PM                                senna-docusate 8.6-50 MG per tablet   Commonly known as:  SENOKOT-S;PERICOLACE   Take 2 tablets by mouth 2 times daily   Last time this was given:  2 tablets on 8/25/2018  7:51 AM                                SINGULAIR PO   Take 10 mg by mouth At Bedtime   Last time this was given:  10 mg on 8/24/2018  9:36 PM                                TRAZODONE HCL PO   Take 25 mg by mouth nightly as needed for sleep                                UNABLE TO FIND   Supplement BCQ 2 tablets in AM                                UNABLE TO FIND   Supplement Vital Protein Powder 2 scoops in AM                                VENTOLIN IN   Inhale 2 puffs into the lungs every 4 hours as needed                                VITAMIN B-2 PO   Take 400 mg by mouth daily                                VITAMIN D-3 PO   Take 5,000 Units by mouth daily                                ZYRTEC ALLERGY PO   Take 10 mg by mouth At Bedtime   Last time this was given:  10 mg on 8/24/2018  9:36 PM

## 2018-08-23 NOTE — IP AVS SNAPSHOT
[facility]    [address]    [facility] [address]    Phone:  [phone]                                       After Visit Summary   8/23/2018    Marita Mata    MRN: [id number]           After Visit Summary Signature Page     I have received my discharge instructions, and my questions have been answered. I have discussed any challenges I see with this plan with the nurse or doctor.    ..........................................................................................................................................  Patient/Patient Representative Signature      ..........................................................................................................................................  Patient Representative Print Name and Relationship to Patient    ..................................................               ................................................  Date                                            Time    ..........................................................................................................................................  Reviewed by Signature/Title    ...................................................              ..............................................  Date                                                            Time          22EPIC Rev 08/18

## 2018-08-24 ENCOUNTER — APPOINTMENT (OUTPATIENT)
Dept: PHYSICAL THERAPY | Facility: CLINIC | Age: 63
DRG: 470 | End: 2018-08-24
Attending: PHYSICIAN ASSISTANT
Payer: COMMERCIAL

## 2018-08-24 ENCOUNTER — APPOINTMENT (OUTPATIENT)
Dept: GENERAL RADIOLOGY | Facility: CLINIC | Age: 63
DRG: 470 | End: 2018-08-24
Attending: PHYSICIAN ASSISTANT
Payer: COMMERCIAL

## 2018-08-24 ENCOUNTER — APPOINTMENT (OUTPATIENT)
Dept: PHYSICAL THERAPY | Facility: CLINIC | Age: 63
DRG: 470 | End: 2018-08-24
Attending: ORTHOPAEDIC SURGERY
Payer: COMMERCIAL

## 2018-08-24 ENCOUNTER — APPOINTMENT (OUTPATIENT)
Dept: OCCUPATIONAL THERAPY | Facility: CLINIC | Age: 63
DRG: 470 | End: 2018-08-24
Attending: PHYSICIAN ASSISTANT
Payer: COMMERCIAL

## 2018-08-24 LAB
HGB BLD-MCNC: 10.8 G/DL (ref 11.7–15.7)
HGB BLD-MCNC: 11.3 G/DL (ref 11.7–15.7)
INR PPP: 1.09 (ref 0.86–1.14)

## 2018-08-24 PROCEDURE — 97161 PT EVAL LOW COMPLEX 20 MIN: CPT | Mod: GP | Performed by: PHYSICAL THERAPIST

## 2018-08-24 PROCEDURE — 40000133 ZZH STATISTIC OT WARD VISIT: Performed by: OCCUPATIONAL THERAPIST

## 2018-08-24 PROCEDURE — 25000132 ZZH RX MED GY IP 250 OP 250 PS 637: Performed by: PHYSICIAN ASSISTANT

## 2018-08-24 PROCEDURE — 85018 HEMOGLOBIN: CPT | Performed by: PHYSICIAN ASSISTANT

## 2018-08-24 PROCEDURE — 12000001 ZZH R&B MED SURG/OB UMMC

## 2018-08-24 PROCEDURE — 99232 SBSQ HOSP IP/OBS MODERATE 35: CPT | Performed by: INTERNAL MEDICINE

## 2018-08-24 PROCEDURE — 36415 COLL VENOUS BLD VENIPUNCTURE: CPT | Performed by: PHYSICIAN ASSISTANT

## 2018-08-24 PROCEDURE — 40000193 ZZH STATISTIC PT WARD VISIT: Performed by: PHYSICAL THERAPIST

## 2018-08-24 PROCEDURE — 25000132 ZZH RX MED GY IP 250 OP 250 PS 637: Performed by: STUDENT IN AN ORGANIZED HEALTH CARE EDUCATION/TRAINING PROGRAM

## 2018-08-24 PROCEDURE — 97110 THERAPEUTIC EXERCISES: CPT | Mod: GP | Performed by: PHYSICAL THERAPIST

## 2018-08-24 PROCEDURE — 97116 GAIT TRAINING THERAPY: CPT | Mod: GP | Performed by: PHYSICAL THERAPIST

## 2018-08-24 PROCEDURE — 85610 PROTHROMBIN TIME: CPT | Performed by: PHYSICIAN ASSISTANT

## 2018-08-24 PROCEDURE — 36415 COLL VENOUS BLD VENIPUNCTURE: CPT | Performed by: INTERNAL MEDICINE

## 2018-08-24 PROCEDURE — 85018 HEMOGLOBIN: CPT | Performed by: INTERNAL MEDICINE

## 2018-08-24 PROCEDURE — 25000132 ZZH RX MED GY IP 250 OP 250 PS 637: Performed by: INTERNAL MEDICINE

## 2018-08-24 PROCEDURE — 97535 SELF CARE MNGMENT TRAINING: CPT | Mod: GO | Performed by: OCCUPATIONAL THERAPIST

## 2018-08-24 PROCEDURE — 97165 OT EVAL LOW COMPLEX 30 MIN: CPT | Mod: GO | Performed by: OCCUPATIONAL THERAPIST

## 2018-08-24 PROCEDURE — 25000125 ZZHC RX 250: Performed by: PHYSICIAN ASSISTANT

## 2018-08-24 PROCEDURE — 73560 X-RAY EXAM OF KNEE 1 OR 2: CPT | Mod: RT

## 2018-08-24 RX ORDER — WARFARIN SODIUM 5 MG/1
5 TABLET ORAL
Status: DISCONTINUED | OUTPATIENT
Start: 2018-08-24 | End: 2018-08-24

## 2018-08-24 RX ORDER — OXYCODONE HYDROCHLORIDE 5 MG/1
5-10 TABLET ORAL
Qty: 70 TABLET | Refills: 0 | Status: SHIPPED | OUTPATIENT
Start: 2018-08-24 | End: 2024-01-12

## 2018-08-24 RX ADMIN — OXYCODONE HYDROCHLORIDE 10 MG: 5 TABLET ORAL at 00:04

## 2018-08-24 RX ADMIN — OXYCODONE HYDROCHLORIDE 5 MG: 5 TABLET ORAL at 16:17

## 2018-08-24 RX ADMIN — MONTELUKAST SODIUM 10 MG: 10 TABLET, FILM COATED ORAL at 21:36

## 2018-08-24 RX ADMIN — OXYCODONE HYDROCHLORIDE 5 MG: 5 TABLET ORAL at 02:59

## 2018-08-24 RX ADMIN — OXYCODONE HYDROCHLORIDE 5 MG: 5 TABLET ORAL at 07:47

## 2018-08-24 RX ADMIN — OXYCODONE HYDROCHLORIDE 5 MG: 5 TABLET ORAL at 11:37

## 2018-08-24 RX ADMIN — SENNOSIDES AND DOCUSATE SODIUM 1 TABLET: 8.6; 5 TABLET ORAL at 07:47

## 2018-08-24 RX ADMIN — RIVAROXABAN 10 MG: 10 TABLET, FILM COATED ORAL at 16:17

## 2018-08-24 RX ADMIN — ACETAMINOPHEN 975 MG: 325 TABLET, FILM COATED ORAL at 16:18

## 2018-08-24 RX ADMIN — Medication 1000 MG: at 09:10

## 2018-08-24 RX ADMIN — CETIRIZINE HYDROCHLORIDE 10 MG: 10 TABLET, FILM COATED ORAL at 21:36

## 2018-08-24 RX ADMIN — Medication 1000 MCG: at 07:47

## 2018-08-24 RX ADMIN — VITAMIN D, TAB 1000IU (100/BT) 5000 UNITS: 25 TAB at 07:46

## 2018-08-24 RX ADMIN — CLINDAMYCIN IN 5 PERCENT DEXTROSE 900 MG: 18 INJECTION, SOLUTION INTRAVENOUS at 02:58

## 2018-08-24 RX ADMIN — OXYCODONE HYDROCHLORIDE 5 MG: 5 TABLET ORAL at 20:29

## 2018-08-24 RX ADMIN — ACETAMINOPHEN 975 MG: 325 TABLET, FILM COATED ORAL at 09:10

## 2018-08-24 RX ADMIN — MAGNESIUM HYDROXIDE 15 ML: 400 SUSPENSION ORAL at 07:47

## 2018-08-24 RX ADMIN — ACETAMINOPHEN 975 MG: 325 TABLET, FILM COATED ORAL at 00:04

## 2018-08-24 RX ADMIN — CITALOPRAM HYDROBROMIDE 20 MG: 20 TABLET ORAL at 07:46

## 2018-08-24 RX ADMIN — SENNOSIDES AND DOCUSATE SODIUM 2 TABLET: 8.6; 5 TABLET ORAL at 20:28

## 2018-08-24 ASSESSMENT — ACTIVITIES OF DAILY LIVING (ADL)
ADLS_ACUITY_SCORE: 10
ADLS_ACUITY_SCORE: 9
ADLS_ACUITY_SCORE: 9
ADLS_ACUITY_SCORE: 10
ADLS_ACUITY_SCORE: 10
PREVIOUS_RESPONSIBILITIES: MEAL PREP;HOUSEKEEPING;LAUNDRY;SHOPPING;YARDWORK;MEDICATION MANAGEMENT;FINANCES;DRIVING
ADLS_ACUITY_SCORE: 9

## 2018-08-24 NOTE — PLAN OF CARE
"Problem: Knee Arthroplasty (Total, Partial) (Adult)  Goal: Signs and Symptoms of Listed Potential Problems Will be Absent, Minimized or Managed (Knee Arthroplasty)  Signs and symptoms of listed potential problems will be absent, minimized or managed by discharge/transition of care (reference Knee Arthroplasty (Total, Partial) (Adult) CPG).  Outcome: Improving    VS: BP 95/53  Pulse 71  Temp 99.3  F (37.4  C) (Oral)  Resp 14  Ht 1.676 m (5' 6\")  Wt 56.2 kg (123 lb 14.4 oz)  SpO2 98%  BMI 20 kg/m2   O2: RA.   Output: Voided 800 at 1830 in bathroom.   Last BM: 8/22/18, passing flatus.   Activity: Ax1 with walker, WBAT, WAR.   Skin: Incision, drain.   Pain: Aching pain in right knee managed with oxycodone 5-10 mg q3h, 10 mg last given at 2107, and ice.   CMS: Numbness and tingling in right foot.   Dressing: CDI.   Diet: Regular.   LDA: PIV left hand infusing LR at 50 ml/hr. PIV right lower forearm SL. Hemovac with 330 bloody/red output, bag marked.   Equipment: Capno, CPM 0-40, IS, PCDs, walker, IV pole, pillows, call light within reach.   Plan: Continue to monitor.   Additional Info:            "

## 2018-08-24 NOTE — PROGRESS NOTES
"Orthopedics Daily Progress Note    S: no acute events overnight, pain adequate, tolerating diet, voiding, no numbness/tinglings, chest pain/shortness of breath, or fever/chills.     O: /57  Pulse 71  Temp 96.6  F (35.9  C) (Oral)  Resp 14  Ht 1.676 m (5' 6\")  Wt 56.2 kg (123 lb 14.4 oz)  SpO2 99%  BMI 20 kg/m2    Intake/Output Summary (Last 24 hours) at 08/24/18 0710  Last data filed at 08/24/18 0622   Gross per 24 hour   Intake             1100 ml   Output             1585 ml   Net             -485 ml       No acute distress  Non-labored respirations  RLE: dressing c,d,i, 5/5 df/pf/ehl/fhl, sensation intact in deep peroneal, superficial peroneal, tibial, sural, saphenous nerve distributions, 2+ DP pulse.    Drain: 30, 350, 180 in last three shifts     Labs:  No lab results found in last 7 days.    Recent Labs  Lab 08/24/18  0515 08/23/18  0859   HGB 11.3* 15.9*       Recent Labs  Lab 08/24/18  0515 08/23/18  1731   INR 1.09 1.04       A/P:   Marita Mata is a 63 year old female with a PMH of DVT in Aug 2017 and depression who is now s/p R TKA on 8/23    -Ortho primary   -Activity: Up with assist and assistive devices as needed until independent. Knee ROM as tolerated. Advance CPM as tolerated to goal of 0 to 90 degrees.  -Weight bearing status: WBAT    -Antibiotics: Clindamycin x 24 hours   -Diet: Begin with clear fluids and progress diet as tolerated. Bowel regimen. Anti-emetics PRN.    -DVT prophylaxis:  Mechanical while in hospital with Warfarin starting POD 0 (Pharmacy to dose) x 4 weeks  -Elevation: Elevate heels off of bed on pillows, no pillows behind the knee at any time    -Wound Care: Dressing change at bedside by Ortho on POD #1  -Drains: Document output per shift, Ortho will discontinue on POD #1-2.    -Pain management: Orals PRN, IV for breakthrough only  -X-rays: AP/Lat R knee XR on POD #1 or immediately before discharge, whichever comes first  -Physical Therapy: Mobilization, ROM, " ADL's  -Occupational Therapy: ADL's  -Labs: Trend Hgb on PODs #1 & 2  -Consults: PT, OT. Hospitalist, appreciate assistance in caring for this patient throughout the perioperative period  -Follow up: 2 weeks for wound check and 6 weeks with Dr. Gutierrez   -Dispo: pending progress, likely home tomorrow     Jean-Paul Pereira MD   Orthopedic Surgery; PGY-4  Pager: [phone]    For any questions regarding this patient please page me prior to paging the ortho resident on call.

## 2018-08-24 NOTE — PROGRESS NOTES
"[facility], [place]   Internal Medicine Daily Note           Interval History/Events     Overnight events reviewed  Reports doing well  Pain controlled.   No lightheadedness or dizziness.        Review of Systems        4 point ROS including Respiratory, CV, GI and , other than that noted above is negative      Medications   I have reviewed current medications  in the \"current medication\" section of Epic.  Relevant changes include:     Physical Exam   General:       Vital signs:    Blood pressure 106/55, pulse 71, temperature 97.6  F (36.4  C), temperature source Oral, resp. rate 16, height 1.676 m (5' 6\"), weight 56.2 kg (123 lb 14.4 oz), SpO2 99 %.  Estimated body mass index is 20 kg/(m^2) as calculated from the following:    Height as of this encounter: 1.676 m (5' 6\").    Weight as of this encounter: 56.2 kg (123 lb 14.4 oz).      Intake/Output Summary (Last 24 hours) at 08/24/18 1326  Last data filed at 08/24/18 0927   Gross per 24 hour   Intake              300 ml   Output             1650 ml   Net            -1350 ml      HEENT: No icterus, no pallor  Cardiovascular: S1, S2 normal  Respiratory:  B/L CTA  GI/Abdomen: Soft, NT, BS+  Neurology: Alert, awake, and oriented. No tremors.   Extremities: Right knee dressing in place.   Skin:      Laboratory and Imaging Studies     I have reviewed  laboratory and imaging studies in the Epic. Pertinent findings are as below:    BMPNo lab results found in last 7 days.  CBC  Recent Labs  Lab 08/24/18  0515   HGB 11.3*     INR  Recent Labs  Lab 08/24/18  0515 08/23/18  1731   INR 1.09 1.04     LFTsNo lab results found in last 7 days.   PANCNo lab results found in last 7 days.        Impression/Plan        63 year old year old female with hx of Asthma, Chronic pain, Hx of DVT, Multinodular goiter is being admitted s/p Right TKA on 08/23/2018  # S/P  Right TKA on 08/23/2018:  Management primarily per Ortho team.  - Wound cares, Dressings, " Surgical pain management, DVT Prophylaxis  per primary team.   - Monitor anemia, hemodynamics, Input/Output  - Encourage Incentive spirometry  - Laxatives for constipation prophylaxis      # Anemia: Hg 11.3 gm/dl. Most likely acute blood loss/hemodilution. No overt signs of bleed. Hemodynamics stable.   - Transfuse if Hg less than 7 gm/dl    # Hx of Rt lower extremity DVT in August 2017: Treated for 3 months. Thought to be due to hormone replacements. As above, DVT Prophylaxis per Ortho.   # Hx of Multinodular goiter: Not on any medications.   # Hx of Asthma: PTA on Albuterol PRN, Beclomethasone daily. Stable currently  - Continue home regimen  # Hx of Allergies: PTA on Cetrizine, Montelukast sodium. Stable  Currently  - Continue   # Hx of Depression/Insomnia: PTA on Citalopram, Trazodone PRN  - Continue  # Nutrition/Vitamin D supplementation: On Cholecalciferol, Riboflavin, Omega 3 fatty acid, Biotin. Continue            Guido Borja  Internal Medicine/Hospitalist  [facility]  [phone]

## 2018-08-24 NOTE — PROGRESS NOTES
"Orthopedics Post Op Check    Procedure: R TKA    S: Patient seen on the floor. Doing well. Pain controlled, tolerating diet, no nausea or vomiting. No chest pain or shortness of breath.     O: /56 (BP Location: Right arm)  Pulse 71  Temp 99.3  F (37.4  C) (Oral)  Resp 14  Ht 1.676 m (5' 6\")  Wt 56.2 kg (123 lb 14.4 oz)  SpO2 99%  BMI 20 kg/m2    No acute distress  Non-labored respirations  RLE: dressing c,d,i, 5/5 df/pf/ehl/fhl, sensation intact in deep peroneal, superficial peroneal, tibial, sural, saphenous nerve distributions, 2+ DP pulse.    A/P:   Marita Mata is a 63 year old female with a PMH of DVT in Aug 2017 and depression who is now s/p R TKA on 8/23    No change in plan. Please see brief op note for full details.   Plan for discharge home in 1-2 days    Jean-Paul Pereira MD   Orthopedic Surgery; PGY-4  Pager: [phone]    For any questions regarding this patient please page me prior to paging the ortho resident on call.     "

## 2018-08-24 NOTE — PLAN OF CARE
Problem: Knee Arthroplasty (Total, Partial) (Adult)  Goal: Signs and Symptoms of Listed Potential Problems Will be Absent, Minimized or Managed (Knee Arthroplasty)  Signs and symptoms of listed potential problems will be absent, minimized or managed by discharge/transition of care (reference Knee Arthroplasty (Total, Partial) (Adult) CPG).   Outcome: Improving          VS:     Pt A/O X 4. Afebrile. VSS. /55 (BP Location: Left arm)  Pulse 71  Temp 97.6  F (36.4  C) (Oral)  Resp 16  SpO2 99%.  Lungs- clear and equal bilaterally. IS encouraged. Denies nausea, shortness of breath, and chest pain.     Output:     Bowels- active in all four quadrants. Voids spontaneously without   difficulty.      Activity:     Pt up SBA with walker.      Skin: Incision, drain/devices.     Pain:     Has pain in the right knee and given oxycodone 5mg, ICE applied, and is tolerating well.      CMS:     CMS and Neuro's are intact. Denies numbness and tingling in all extremities. pedial pulses +2.      Dressing:     Right knee incisional dressing is CDI. Hemovac drain 120 ml output of serosanguinous drainage.       Diet:     Pt is on a Regular diet and appetite was good this shift.       LDA:     PIV is patent in the left hand and right forearm and saline locked.      Equipment:     CPM is ON and set to 45 degrees flexion and 0 degrees extension. Bilateral heels are elevated off the bed. Uses walker for ambulation.     Plan:     Likely home tomorrow per ortho resident note.

## 2018-08-24 NOTE — PLAN OF CARE
Problem: PT General Care Plan  Goal: Bed Mobility (PT)  PT Bed Mobility  Pt will be able to independently perform supine to/from sit to increase independence at home.

## 2018-08-24 NOTE — PROGRESS NOTES
08/24/18 0946   Quick Adds   Type of Visit Initial Occupational Therapy Evaluation   Living Environment   Lives With spouse   Living Arrangements house   Home Accessibility bed and bath on same level;stairs within home   Number of Stairs to Enter Home 3   Number of Stairs Within Home 3   Stair Railings at Home none   Transportation Available car   Living Environment Comment no railings but wall to hang on to. only needs to go downstairs for laudry, which she won't havet o do for awhile   Self-Care   Dominant Hand right   Usual Activity Tolerance good   Current Activity Tolerance moderate   Regular Exercise yes   Activity/Exercise Type biking;walking;strength training   Exercise Amount/Frequency 5-7 times/wk   Equipment Currently Used at Home none   Activity/Exercise/Self-Care Comment walk most days plus PT exercises. Stationary bike at home utilized as well   Functional Level Prior   Ambulation 0-->independent   Transferring 0-->independent   Toileting 0-->independent   Bathing 0-->independent   Dressing 0-->independent   Eating 0-->independent   Communication 0-->understands/communicates without difficulty   Swallowing 0-->swallows foods/liquids without difficulty   Cognition 0 - no cognition issues reported   Fall history within last six months yes   Number of times patient has fallen within last six months 1  (fall on wet floor, landed on sore knee)   Which of the above functional risks had a recent onset or change? ambulation;transferring;toileting;bathing;dressing   Prior Functional Level Comment indep. with ADL/IADLs at baseline   General Information   Onset of Illness/Injury or Date of Surgery - Date 08/23/18   Referring Physician Iain Medel PA-C   Additional Occupational Profile Info/Pertinent History of Current Problem 63 year old female with a PMH of DVT in Aug 2017 and depression who is now s/p R TKA on 8/23   Weight-Bearing Status - RLE weight-bearing as tolerated   General Info Comments  activity orders; amb. with A   Cognitive Status Examination   Cognitive Comment WNL   Visual Perception   Visual Perception No deficits were identified   Pain Assessment   Patient Currently in Pain (post op pain)   Range of Motion (ROM)   ROM Quick Adds No deficits were identified   Strength   Manual Muscle Testing Quick Adds No deficits were identified   Transfer Skill: Bed to Chair/Chair to Bed   Level of [place]: Bed to Chair stand-by assist   Assistive Device - Transfer Skill Bed to Chair Chair to Bed Rehab Eval rolling walker   Transfer Skill: Sit to Stand   Level of [place]: Sit/Stand independent   Assistive Device for Transfer: Sit/Stand rolling walker   Upper Body Dressing   Level of [place]: Dress Upper Body independent   Lower Body Dressing   Level of [place]: Dress Lower Body stand-by assist   Physical Assist/Nonphysical Assist: Dress Lower Body set-up required   Grooming   Level of [place]: Grooming independent   Eating/Self Feeding   Level of [place]: Eating independent   Instrumental Activities of Daily Living (IADL)   Previous Responsibilities meal prep;housekeeping;laundry;shopping;yardwork;medication management;finances;driving   Activities of Daily Living Analysis   Impairments Contributing to Impaired Activities of Daily Living pain;ROM decreased   General Therapy Interventions   Planned Therapy Interventions ADL retraining;transfer training;home program guidelines;progressive activity/exercise   Clinical Impression   Criteria for Skilled Therapeutic Interventions Met yes, treatment indicated   OT Diagnosis impaired ADLs and functional mobility   Influenced by the following impairments post op limitations   Assessment of Occupational Performance 3-5 Performance Deficits   Identified Performance Deficits dressing, bathing, toileting   Clinical Decision Making (Complexity) Low complexity   Therapy Frequency (eval and 1 tx. only)   Predicted Duration of Therapy  "Intervention (days/wks) 08/24/18   Anticipated Equipment Needs at Discharge (TBD)   Anticipated Discharge Disposition Home with Assist   Risks and Benefits of Treatment have been explained. Yes   Patient, Family & other staff in agreement with plan of care Yes   [facility] TM \"6 Clicks\"   2016, Trustees of [facility], under license to [employer].  All rights reserved.   6 Clicks Short Forms Daily Activity Inpatient Short Form   [facility]  \"6 Clicks\" Daily Activity Inpatient Short Form   1. Putting on and taking off regular lower body clothing? 4 - None   2. Bathing (including washing, rinsing, drying)? 3 - A Little   3. Toileting, which includes using toilet, bedpan or urinal? 4 - None   4. Putting on and taking off regular upper body clothing? 4 - None   5. Taking care of personal grooming such as brushing teeth? 4 - None   6. Eating meals? 4 - None   Daily Activity Raw Score (Score out of 24.Lower scores equate to lower levels of function) 23   Total Evaluation Time   Total Evaluation Time (Minutes) 7     "

## 2018-08-24 NOTE — PROVIDER NOTIFICATION
Notified Dr. Borja via text page - FYI Pt became light-headed during therapy. BP dropped to 81/32. BP currently 114/38.     Dr. Borja ordered stat hemoglobin

## 2018-08-24 NOTE — PROGRESS NOTES
08/24/18 0834   Quick Adds   Type of Visit Initial PT Evaluation       Present no   Language english   Living Environment   Lives With spouse   Living Arrangements house   Home Accessibility bed and bath on same level;stairs within home   Number of Stairs to Enter Home 3   Number of Stairs Within Home 3   Stair Railings at Home none   Transportation Available car   Living Environment Comment no railings but wall to hang on to. only needs to go downstairs for laudry, which she won't havet o do for awhile   Self-Care   Dominant Hand right   Usual Activity Tolerance good   Current Activity Tolerance moderate   Regular Exercise yes   Activity/Exercise Type biking;walking;strength training   Exercise Amount/Frequency 5-7 times/wk   Equipment Currently Used at Home none   Activity/Exercise/Self-Care Comment walk most days plus PT exercises. Stationary bike at home utilized as well   Functional Level Prior   Ambulation 0-->independent   Transferring 0-->independent   Toileting 0-->independent   Bathing 0-->independent   Dressing 0-->independent   Eating 0-->independent   Communication 0-->understands/communicates without difficulty   Swallowing 0-->swallows foods/liquids without difficulty   Cognition 0 - no cognition issues reported   Fall history within last six months yes   Number of times patient has fallen within last six months 1  (slipped on wet floor and landed on bad knee)   Which of the above functional risks had a recent onset or change? ambulation;transferring   Prior Functional Level Comment Pt independent in all mobility, fall due to slippery/wet floor   General Information   Onset of Illness/Injury or Date of Surgery - Date 08/23/18   Referring Physician Lupe Gutierrez MD   Patient/Family Goals Statement everything: walking and biking   Pertinent History of Current Problem (include personal factors and/or comorbidities that impact the POC) Pt s/p right TKA. History of DVT,  migraines, anxiety, and chronic pain   Precautions/Limitations fall precautions   Weight-Bearing Status - LUE full weight-bearing   Weight-Bearing Status - RUE full weight-bearing   Weight-Bearing Status - LLE full weight-bearing   Weight-Bearing Status - RLE weight-bearing as tolerated   General Observations Pt supine in bed at start of eval with CPM, IV, capno, PCD, and hemovac.   Cognitive Status Examination   Orientation orientation to person, place and time   Level of Consciousness alert   Follows Commands and Answers Questions 100% of the time   Personal Safety and Judgment intact   Memory intact   Pain Assessment   Patient Currently in Pain Yes, see Vital Sign flowsheet  (achy but managed with pain meds)   Integumentary/Edema   Integumentary/Edema Comments incision not observed due to dressing   Posture    Posture Comments Pt appeared to have good posture during gait, looked up while she walked   Range of Motion (ROM)   ROM Comment 0-11-81 right LE   Strength   Strength Comments strength not formally tested, able to lift right LE out of bed without assist and demonstrated functional left LE strength for transfers/gait.    Bed Mobility   Bed Mobility Comments supine to sit with SBA   Bed Mobility Skill: Scooting/Bridging   Level of [place]: Scooting/Bridging independent   Bed Mobility Skill: Supine to Sit   Level of [place]: Supine/Sit stand-by assist   Transfer Skills   Transfer Comments sit to stand with FWW, CGA x1. Stand to sit with FWW and SBA   Gait   Gait Comments Pt ambulated 200ft with FWW and SBA. Pt demonstrated step to pattern with her left foot due to decreased SLS time on right LE. Good heel toe pattern   Balance   Balance Comments Pt demonstrated good balance seated at EOB and during ambulation   Sensory Examination   Sensory Perception Comments Pt stated no numbness/tingling in extremeties   General Therapy Interventions   Planned Therapy Interventions bed mobility training;gait  "training;ROM;strengthening;stretching   Intervention Comments Pt will benefit from bed mobility and gait training as well as right LE strengthening/ROM to improve safety and increase functional independence   Clinical Impression   Criteria for Skilled Therapeutic Intervention yes, treatment indicated   PT Diagnosis Right LE weakness and ROM deficit   Influenced by the following impairments right TKA, deficits in strength and ROM   Functional limitations due to impairments impaired bed mobility, transfers, and gait   Clinical Presentation Stable/Uncomplicated   Clinical Presentation Rationale Pt is healthy, active 62 y/o female with no comorbidities that would negatively effect therapy or her recovery.   Clinical Decision Making (Complexity) Low complexity   Therapy Frequency` 2 times/day   Predicted Duration of Therapy Intervention (days/wks) 2 days   Anticipated Discharge Disposition Home with Outpatient Therapy   Risk & Benefits of therapy have been explained Yes   Patient, Family & other staff in agreement with plan of care Yes   [facility]  \"6 Clicks\" V.2 Basic Mobility Inpatient Short Form   1. Turning from your back to your side while in a flat bed without using bedrails? 4 - None   2. Moving from lying on your back to sitting on the side of a flat bed without using bedrails? 4 - None   3. Moving to and from a bed to a chair (including a wheelchair)? 3 - A Little   4. Standing up from a chair using your arms (e.g., wheelchair, or bedside chair)? 4 - None   5. To walk in hospital room? 4 - None   6. Climbing 3-5 steps with a railing? 3 - A Little   Basic Mobility Raw Score (Score out of 24.Lower scores equate to lower levels of function) 22   Total Evaluation Time   Total Evaluation Time (Minutes) 10     "

## 2018-08-24 NOTE — PLAN OF CARE
Problem: Patient Care Overview  Goal: Plan of Care/Patient Progress Review  Problem: Patient Care Overview  Goal: Plan of Care/Patient Progress Review  Discharge Planner PT   Patient plan for discharge: Home with OP PT  Current status: PT eval completed. Pt supine to standing with SBA x1. Sit to/from stand with FWW and SBA to CGA x1.  Ambulated 200ft with FWW and SBA.  Right knee ROM 0-11-81 AROM.   Barriers to return to prior living situation: stairs, strength and ROM deficits  Recommendations for discharge: Home with OP PT  Rationale for recommendations: Pt will benefit from continued skilled PT for right LE strengthening/ROM and gait training

## 2018-08-24 NOTE — PLAN OF CARE
Problem: Patient Care Overview  Goal: Plan of Care/Patient Progress Review  Discharge Planner PT   Patient plan for discharge: Home with OP PT  Current status: PT eval completed. Pt supine to/from sitting with SBA x1. Sit to stand with SBA to CGA x1. Stand to sit with SBA x1. Ambulated 200ft with FWW and SBA.   Barriers to return to prior living situation: stairs, strength and ROM deficits  Recommendations for discharge: Home with OP PT  Rationale for recommendations: Pt will benefit from continued skilled PT for right LE strengthening/ROM and gait training to improve safety and functional mobility during transfers and gait.       Entered by: Willow Conn 08/24/2018 9:47 AM

## 2018-08-24 NOTE — PLAN OF CARE
Problem: Patient Care Overview  Goal: Plan of Care/Patient Progress Review  Occupational Therapy Discharge Summary    Reason for therapy discharge:    All goals and outcomes met, no further needs identified.    Progress towards therapy goal(s). See goals on Care Plan in [facility] electronic health record for goal details.  Pt. indep. with BADLs and functional mobility ambulating with FWW.    Therapy recommendation(s):    No further therapy is recommended.

## 2018-08-24 NOTE — PROGRESS NOTES
Care Coordinator Progress Note    Admission Date/Time:  8/23/2018  Attending MD:  Lupe Gutierrez MD    Data  Chart reviewed, discussed with interdisciplinary team.   Patient was admitted for: Status post knee surgery.    Concerns with insurance coverage for discharge needs: None.  Current Living Situation: Patient lives with spouse.  Support System: Supportive and Involved  Services Involved: Outpatient Rehab  Transportation at Discharge: Family or friend will provide  Transportation to Medical Appointments:   - Name of caregiver: Miles   Barriers to Discharge: None    Coordination of Care and Referrals: Provided patient/family with options for Outpatient Rehab. Met with patient and her  at bedside to discuss discharge planning. Per patient request a referral was sent to [facility] for physical therapy. Patient has already scheduled her appointments with [facility] for post surgical care. No further discharge concerns at this time. BEDSIDE RN TO FAX SIGNED ORDERS TO [facility].  RNCC available as needed.    [facility]  [address], [place] [address]   Phone [phone]   Fax [phone]         Plan  Anticipated Discharge Date:  8/25/2018  Anticipated Discharge Plan:  Home with outpatient PT    Magda Moreau RN, BSN  Care Coordinator, 8A  Phone [phone]  Pager [phone]

## 2018-08-25 ENCOUNTER — APPOINTMENT (OUTPATIENT)
Dept: PHYSICAL THERAPY | Facility: CLINIC | Age: 63
DRG: 470 | End: 2018-08-25
Attending: ORTHOPAEDIC SURGERY
Payer: COMMERCIAL

## 2018-08-25 VITALS
DIASTOLIC BLOOD PRESSURE: 42 MMHG | WEIGHT: 123.9 LBS | BODY MASS INDEX: 19.91 KG/M2 | HEART RATE: 68 BPM | OXYGEN SATURATION: 99 % | RESPIRATION RATE: 16 BRPM | HEIGHT: 66 IN | TEMPERATURE: 98.6 F | SYSTOLIC BLOOD PRESSURE: 108 MMHG

## 2018-08-25 LAB — HGB BLD-MCNC: 10.9 G/DL (ref 11.7–15.7)

## 2018-08-25 PROCEDURE — 99232 SBSQ HOSP IP/OBS MODERATE 35: CPT | Performed by: INTERNAL MEDICINE

## 2018-08-25 PROCEDURE — 85018 HEMOGLOBIN: CPT | Performed by: PHYSICIAN ASSISTANT

## 2018-08-25 PROCEDURE — 40000193 ZZH STATISTIC PT WARD VISIT

## 2018-08-25 PROCEDURE — 25000132 ZZH RX MED GY IP 250 OP 250 PS 637: Performed by: INTERNAL MEDICINE

## 2018-08-25 PROCEDURE — 25000132 ZZH RX MED GY IP 250 OP 250 PS 637: Performed by: PHYSICIAN ASSISTANT

## 2018-08-25 PROCEDURE — 36415 COLL VENOUS BLD VENIPUNCTURE: CPT | Performed by: PHYSICIAN ASSISTANT

## 2018-08-25 PROCEDURE — 97116 GAIT TRAINING THERAPY: CPT | Mod: GP

## 2018-08-25 PROCEDURE — 97110 THERAPEUTIC EXERCISES: CPT | Mod: GP

## 2018-08-25 RX ORDER — ACETAMINOPHEN 325 MG/1
975 TABLET ORAL EVERY 8 HOURS
Qty: 100 TABLET | Refills: 0 | Status: SHIPPED | OUTPATIENT
Start: 2018-08-25

## 2018-08-25 RX ORDER — AMOXICILLIN 250 MG
2 CAPSULE ORAL 2 TIMES DAILY
Qty: 60 TABLET | Refills: 1 | Status: SHIPPED | OUTPATIENT
Start: 2018-08-25

## 2018-08-25 RX ADMIN — Medication 1000 MCG: at 07:51

## 2018-08-25 RX ADMIN — ACETAMINOPHEN 975 MG: 325 TABLET, FILM COATED ORAL at 00:44

## 2018-08-25 RX ADMIN — VITAMIN D, TAB 1000IU (100/BT) 5000 UNITS: 25 TAB at 07:51

## 2018-08-25 RX ADMIN — CITALOPRAM HYDROBROMIDE 20 MG: 20 TABLET ORAL at 07:51

## 2018-08-25 RX ADMIN — OXYCODONE HYDROCHLORIDE 5 MG: 5 TABLET ORAL at 14:05

## 2018-08-25 RX ADMIN — SENNOSIDES AND DOCUSATE SODIUM 2 TABLET: 8.6; 5 TABLET ORAL at 07:51

## 2018-08-25 RX ADMIN — Medication 1000 MG: at 07:51

## 2018-08-25 RX ADMIN — ACETAMINOPHEN 975 MG: 325 TABLET, FILM COATED ORAL at 09:17

## 2018-08-25 RX ADMIN — OXYCODONE HYDROCHLORIDE 5 MG: 5 TABLET ORAL at 09:17

## 2018-08-25 ASSESSMENT — ACTIVITIES OF DAILY LIVING (ADL)
ADLS_ACUITY_SCORE: 9

## 2018-08-25 NOTE — PLAN OF CARE
Problem: Knee Arthroplasty (Total, Partial) (Adult)  Goal: Signs and Symptoms of Listed Potential Problems Will be Absent, Minimized or Managed (Knee Arthroplasty)  Signs and symptoms of listed potential problems will be absent, minimized or managed by discharge/transition of care (reference Knee Arthroplasty (Total, Partial) (Adult) CPG).   Outcome: Improving    VS:     VSS on RA   Output:     Voids using bathroom  BM 8.22 and passing gas   Activity:     UP with SBA using FWW, CPM 0-55,    Skin: WDL ex incision, scars   Pain:     PRN 5mg oxycodone given e3h or as requested  Ice applied as requested  Alternative therapies offered   Neuro/CMS:     no numbness or no tingling   Dressing:     CDI   Diet:     regular diet, tolerated well, no N/V   LDA:     IV SL and patent   Equipment:     CPM, walker, PCD, IV pole,    Plan:     Continue plan of care and pain management   Additional Info:     Possible DC tomorrow.

## 2018-08-25 NOTE — PLAN OF CARE
Problem: Patient Care Overview  Goal: Plan of Care/Patient Progress Review  Discharge Planner PT   Patient plan for discharge: home with assist as needed and OP PT  Current status:  STS with SBAx1 without AD.  Agreeable to try SEC and crutches vs FWW.  Ambulates 50ft, 250ft with SEC, and 350ft with crutches.  Requires CGA-SBAx1 for all gait.  Upon second gait rep with SEC, pt reports feeling much more comfortable with SEC.  Ascends and descends 6 steps with single railing and SEC with SBAx1.  Educated on sitting ex in order to increase R knee ROM.  R knee ROM today: 0-4-95.    Barriers to return to prior living situation: none  Recommendations for discharge: home with assist as needed and OP PT  Rationale for recommendations: OP PT in order to progress R knee ROM, functional strength and mobility without AD.        Entered by: Cullen Castillo 08/25/2018 12:16 PM

## 2018-08-25 NOTE — PROGRESS NOTES
"[facility], [place]   Internal Medicine Daily Note           Interval History/Events     Overnight events reviewed  Reports doing well  Denies any chest pain, shrortness of breath  Passing gas. No BM Yet  Tolerating diet.        Review of Systems        4 point ROS including Respiratory, CV, GI and , other than that noted above is negative      Medications   I have reviewed current medications  in the \"current medication\" section of Epic.  Relevant changes include:     Physical Exam   General:       Vital signs:    Blood pressure 108/42, pulse 68, temperature 98.6  F (37  C), temperature source Oral, resp. rate 16, height 1.676 m (5' 6\"), weight 56.2 kg (123 lb 14.4 oz), SpO2 99 %.  Estimated body mass index is 20 kg/(m^2) as calculated from the following:    Height as of this encounter: 1.676 m (5' 6\").    Weight as of this encounter: 56.2 kg (123 lb 14.4 oz).      Intake/Output Summary (Last 24 hours) at 08/24/18 1326  Last data filed at 08/24/18 0927   Gross per 24 hour   Intake              300 ml   Output             1650 ml   Net            -1350 ml      HEENT: No icterus, no pallor  Cardiovascular: S1, S2 normal  Respiratory:  B/L CTA  GI/Abdomen: Soft, NT, BS+  Neurology: Alert, awake, and oriented. No tremors.   Extremities: Right knee dressing in place.   Skin:      Laboratory and Imaging Studies     I have reviewed  laboratory and imaging studies in the Epic. Pertinent findings are as below:    BMPNo lab results found in last 7 days.  CBC    Recent Labs  Lab 08/25/18  0623   HGB 10.9*     INR    Recent Labs  Lab 08/24/18  0515 08/23/18  1731   INR 1.09 1.04     LFTsNo lab results found in last 7 days.   PANCNo lab results found in last 7 days.        Impression/Plan        63 year old year old female with hx of Asthma, Chronic pain, Hx of DVT, Multinodular goiter is being admitted s/p Right TKA on 08/23/2018  # S/P  Right TKA on 08/23/2018:  Management primarily per Ortho " team.  - Wound cares, Dressings, Surgical pain management, DVT Prophylaxis  per primary team.   - Monitor anemia, hemodynamics, Input/Output  - Encourage Incentive spirometry  - Laxatives for constipation prophylaxis    # Anemia: Hg 10.9 gm/dl. Most likely acute blood loss/hemodilution. No overt signs of bleed. Hemodynamics stable.   - Transfuse if Hg less than 7 gm/dl    # Hx of Rt lower extremity DVT in August 2017: Treated for 3 months. Thought to be due to hormone replacements. As above, DVT Prophylaxis per Ortho.   # Hx of Multinodular goiter: Not on any medications.   # Hx of Asthma: PTA on Albuterol PRN, Beclomethasone daily. Stable currently  - Continue home regimen  # Hx of Allergies: PTA on Cetrizine, Montelukast sodium. Stable  Currently  - Continue   # Hx of Depression/Insomnia: PTA on Citalopram, Trazodone PRN  - Continue  # Nutrition/Vitamin D supplementation: On Cholecalciferol, Riboflavin, Omega 3 fatty acid, Biotin. Continue            Guido Bojra  Internal Medicine/Hospitalist  [facility]  [phone]

## 2018-08-25 NOTE — PLAN OF CARE
Problem: Knee Arthroplasty (Total, Partial) (Adult)  Goal: Signs and Symptoms of Listed Potential Problems Will be Absent, Minimized or Managed (Knee Arthroplasty)  Signs and symptoms of listed potential problems will be absent, minimized or managed by discharge/transition of care (reference Knee Arthroplasty (Total, Partial) (Adult) CPG).     VS: Soft BP but trending stable, afebrile, denies CP, LH/dizziness   O2: 98% on RA, denies SOB   Output: Voiding adequately in bathroom   Last BM: 8/22/2018, passing gas   Activity: WBAT with walker, Ax1, ambulated to bathroom   Skin: Intact except for incision   Pain: Reports achy/sore pain in R knee, managed with scheduled Tylenol & ice   CMS: Intact; denies N/T in all extremities    Dressing: CDI   Diet: Regular; denies N/V   LDA: PIV in R arm SL, PIV in L arm SL   Equipment: CPM (0-55), PCDs   Plan: Continue to monitor. Likely discharge to home today.    Additional Info:

## 2018-08-25 NOTE — PROGRESS NOTES
"Orthopedics Daily Progress Note    S: no acute events overnight, pain controlled, tolerating diet, voiding, no numbness/tinglings, chest pain/shortness of breath, or fever/chills.     O: /42 (BP Location: Left arm)  Pulse 68  Temp 98.6  F (37  C) (Oral)  Resp 16  Ht 1.676 m (5' 6\")  Wt 56.2 kg (123 lb 14.4 oz)  SpO2 99%  BMI 20 kg/m2    No acute distress  Non-labored respirations  RLE: dressing c,d,i, 5/5 df/pf/ehl/fhl, sensation intact in deep peroneal, superficial peroneal, tibial, sural, saphenous nerve distributions, 2+ DP pulse.    Labs:  No lab results found in last 7 days.    Recent Labs  Lab 08/25/18  0623 08/24/18  1607 08/24/18  0515 08/23/18  0859   HGB 10.9* 10.8* 11.3* 15.9*       Recent Labs  Lab 08/24/18  0515 08/23/18  1731   INR 1.09 1.04       A/P:   Marita Mata is a 63 year old female with a PMH of DVT in Aug 2017 and depression who is now s/p R TKA on 8/23    -Ortho primary   -Activity: Up with assist and assistive devices as needed until independent. Knee ROM as tolerated. Advance CPM as tolerated to goal of 0 to 90 degrees.  -Weight bearing status: WBAT    -Antibiotics: Clindamycin x 24 hours   -Diet: Begin with clear fluids and progress diet as tolerated. Bowel regimen. Anti-emetics PRN.    -DVT prophylaxis:  Mechanical while in hospital with Xarelto for DVT ppx given hx of DVT; 10 mg daily x 4 weeks  -Elevation: Elevate heels off of bed on pillows, no pillows behind the knee at any time    -Wound Care: Dressing changed POD#1  -Drains: out    -Pain management: Orals PRN  -X-rays: AP/Lat R knee XR on POD #1 or immediately before discharge, whichever comes first  -Physical Therapy: Mobilization, ROM, ADL's  -Occupational Therapy: ADL's  -Labs: Trend Hgb on PODs #1 & 2  -Consults: PT, OT. Hospitalist, appreciate assistance in caring for this patient throughout the perioperative period  -Follow up: 2 weeks for wound check and 6 weeks with Dr. Gutierrez   -Valerieo: home today "     Jean-Paul Pereira MD   Orthopedic Surgery; PGY-4  Pager: [phone]    For any questions regarding this patient please page me prior to paging the ortho resident on call.

## 2018-08-25 NOTE — PLAN OF CARE
Problem: Patient Care Overview  Goal: Plan of Care/Patient Progress Review  Outcome: Adequate for Discharge Date Met: 08/25/18  Pt. states ready for discharge and is requesting discharge. PIV removed . Went over discharge instructions with patient .Pt. states that she understands her therapeutic discharge plan and her medication regimen. Pt. states that she will follow her medication regimen and her therapeutic discharge plan. All question were addressed.  Pt. left the unit at 15:00 with all valuables, belongings, medications and copy of her after visit summary.

## 2018-08-25 NOTE — DISCHARGE SUMMARY
ORTHOPEDIC SURGERY DISCHARGE SUMMARY    Marita Mata [id number] 8/23/2018  8:12 AM  63 year old female  8/25/2018    Date of Admission: 8/23/2018  Date of Discharge: 8/25/2018  Disposition: home  Primary care physician: Laury Heredia  Orthopaedic physician provider(s): Dr. Brenda MD, Orthopedic Surgery    ADMISSION DIAGNOSIS:  Right knee OA     HPI:  63-year-old female with right knee osteoarthritis.  Patient tried multiple nonoperative interventions however this did not provide adequate relief.  Risk benefits alternatives total knee arthroplasty were reviewed and she elected to proceed.    DISCHARGE DIAGNOSIS:   Right knee osteoarthritis    SURGERY:   Right total knee arthroplasty on 8/23/2018    HOSPITAL COURSE:  Surgery was uncomplicated. The patient has done well post-operatively. Medicine  consult was requested for co-management. she has received routine nursing cares and is medically stable. Vital signs are stable. she is tolerating a regular diet without GI distress/nausea or vomiting; and voiding spontaneously. she has met PT/OT goals for safe mobility. Pain is controlled on oral medications which will be available at home. she will be given stool softeners to use while taking pain medications to help prevent constipation. she has a safe discharge plan to home.     DVT prophylaxis:  Aspirin 325 daily for 4 weeks and   Antibiotics:  Ancef given periop and 24 hours postop.   Activity:   As tolerated  Follow up:   Wound check in the orthopedic clinic in 2 weeks, then follow up with Dr. Gutierrez in 6 weeks.     Other discharge orders and instructions as below.    ORTHOPEDIC EXAM:  Vitals:    B/P: 97/50, T: 97.5, P: 68, R: 16  Physical Exam:  Please see the progress note from the day of discharge.   Pertinent exam findings followed during admission: none     LABS:    Recent Labs  Lab 08/25/18  0623 08/24/18  1607 08/24/18  0515 08/23/18  0859   HGB 10.9* 10.8* 11.3* 15.9*     No lab results found in last 7  days.    Recent Labs  Lab 08/24/18  0515 08/23/18  1731   INR 1.09 1.04       ALLERGIES:     Allergies   Allergen Reactions     Cephalexin Swelling     Morphine Nausea       PENDING TESTS RESULTS:  None     SUB-SPECIALTY CONSULTANT RECOMMENDATIONS:  Medicine was consulted for medical co-management. Assessment and plan are as follows:  # S/P  Right TKA on 08/23/2018:  Management primarily per Ortho team.  - Wound cares, Dressings, Surgical pain management, DVT Prophylaxis  per primary team.   - Monitor anemia, hemodynamics, Input/Output  - Encourage Incentive spirometry  - Laxatives for constipation prophylaxis     # Anemia: Hg 10.9 gm/dl. Most likely acute blood loss/hemodilution. No overt signs of bleed. Hemodynamics stable.   - Transfuse if Hg less than 7 gm/dl     # Hx of Rt lower extremity DVT in August 2017: Treated for 3 months. Thought to be due to hormone replacements. As above, DVT Prophylaxis per Ortho.   # Hx of Multinodular goiter: Not on any medications.   # Hx of Asthma: PTA on Albuterol PRN, Beclomethasone daily. Stable currently  - Continue home regimen  # Hx of Allergies: PTA on Cetrizine, Montelukast sodium. Stable  Currently  - Continue   # Hx of Depression/Insomnia: PTA on Citalopram, Trazodone PRN  - Continue  # Nutrition/Vitamin D supplementation: On Cholecalciferol, Riboflavin, Omega 3 fatty acid, Biotin. Continue     PLANNED DISCHARGE ORDERS, RECOMMENDATIONS, AND FOLLOWUP:    Discharge Procedure Orders  Physical Therapy Referral   Referral Type: Rehab Therapy Physical Therapy     Assign Questionnaire Series to Patient          Review of your medicines      UNREVIEWED medicines. Ask your doctor about these medicines       Dose / Directions    biotin 1000 MCG Tabs tablet        Dose:  1000 mcg   Take 1,000 mcg by mouth daily   Refills:  0       CITALOPRAM HYDROBROMIDE PO        Dose:  20 mg   Take 20 mg by mouth daily   Refills:  0       diclofenac 1 % Gel topical gel   Commonly known as:   VOLTAREN        Place onto the skin nightly as needed for moderate pain   Refills:  0       MAGNESIUM OXIDE PO        Dose:  500 mg   Take 500 mg by mouth daily   Refills:  0       OMEGA 3 PO        Dose:  1000 mg   Take 1,000 mg by mouth daily   Refills:  0       Pseudoephedrine-Ibuprofen  MG Tabs        Dose:  1 tablet   Take 1 tablet by mouth daily   Refills:  0       QVAR IN        Dose:  1 puff   Inhale 1 puff into the lungs 2 times daily   Refills:  0       SINGULAIR PO        Dose:  10 mg   Take 10 mg by mouth At Bedtime   Refills:  0       TRAZODONE HCL PO        Dose:  25 mg   Take 25 mg by mouth nightly as needed for sleep   Refills:  0       UNABLE TO FIND        Supplement BCQ 2 tablets in AM   Refills:  0       UNABLE TO FIND        Supplement Vital Protein Powder 2 scoops in AM   Refills:  0       VENTOLIN IN        Dose:  2 puff   Inhale 2 puffs into the lungs every 4 hours as needed   Refills:  0       VITAMIN B-2 PO        Dose:  400 mg   Take 400 mg by mouth daily   Refills:  0       VITAMIN D-3 PO        Dose:  5000 Units   Take 5,000 Units by mouth daily   Refills:  0       ZYRTEC ALLERGY PO        Dose:  10 mg   Take 10 mg by mouth At Bedtime   Refills:  0         START taking       Dose / Directions    oxyCODONE IR 5 MG tablet   Commonly known as:  ROXICODONE        Dose:  5-10 mg   Take 1-2 tablets (5-10 mg) by mouth every 3 hours as needed for other (pain control or improvement in physical function. Hold dose for analgesic side effects.)   Quantity:  70 tablet   Refills:  0            Where to get your medicines      Some of these will need a paper prescription and others can be bought over the counter. Ask your nurse if you have questions.     Bring a paper prescription for each of these medications      oxyCODONE IR 5 MG tablet             Jean-Paul Pereira MD  Orthopedic Surgery, PGY-4  Pager: [phone]

## 2018-08-26 ENCOUNTER — PATIENT OUTREACH (OUTPATIENT)
Dept: CARE COORDINATION | Facility: CLINIC | Age: 63
End: 2018-08-26

## 2018-08-27 NOTE — PROGRESS NOTES
[facility]: Post-Discharge Note  SITUATION                                                      Admission:    Admission Date: 08/23/18   Reason for Admission: Right knee osteoarthritis  Discharge:    Discharge Date: 08/25/18   Discharge Diagnosis: Right knee osteoarthritis   Discharge Service: Orthopedics   Discharge Plan: Follow up on 9/5 for wound check and 9/26 to see Dr. Gutierrez      BACKGROUND                                                      63-year-old female with right knee osteoarthritis. Patient tried multiple nonoperative interventions however this did not provide adequate relief.  Risk benefits alternatives total knee arthroplasty were reviewed and she elected to proceed.    HOSPITAL COURSE:  Surgery was uncomplicated. The patient has done well post-operatively. Medicine  consult was requested for co-management. she has received routine nursing cares and is medically stable. Vital signs are stable. she is tolerating a regular diet without GI distress/nausea or vomiting; and voiding spontaneously. she has met PT/OT goals for safe mobility. Pain is controlled on oral medications which will be available at home. she will be given stool softeners to use while taking pain medications to help prevent constipation. she has a safe discharge plan to home.     ASSESSMENT      Patient reports symptoms are: Unchanged  Does the patient have all of their medications?: Yes  Does patient know what their new medications are for?: Yes  Does paient have a follow-up appointment scheduled?: Yes  Does patient have any other questions or concerns?: No       PLAN                                                      Outpatient Plan:  Follow up on 9/5 for wound check and 9/26 to see Dr. Brenda Bocanegra, [facility]

## 2019-11-05 ENCOUNTER — HEALTH MAINTENANCE LETTER (OUTPATIENT)
Age: 64
End: 2019-11-05

## 2020-11-22 ENCOUNTER — HEALTH MAINTENANCE LETTER (OUTPATIENT)
Age: 65
End: 2020-11-22

## 2021-02-23 ENCOUNTER — TRANSFERRED RECORDS (OUTPATIENT)
Dept: HEALTH INFORMATION MANAGEMENT | Facility: CLINIC | Age: 66
End: 2021-02-23

## 2021-03-26 ENCOUNTER — IMMUNIZATION (OUTPATIENT)
Dept: FAMILY MEDICINE | Facility: CLINIC | Age: 66
End: 2021-03-26
Payer: COMMERCIAL

## 2021-03-26 PROCEDURE — 0011A PR COVID VAC MODERNA 100 MCG/0.5 ML IM: CPT

## 2021-03-26 PROCEDURE — 91301 PR COVID VAC MODERNA 100 MCG/0.5 ML IM: CPT

## 2021-04-23 ENCOUNTER — IMMUNIZATION (OUTPATIENT)
Dept: FAMILY MEDICINE | Facility: CLINIC | Age: 66
End: 2021-04-23
Attending: FAMILY MEDICINE
Payer: COMMERCIAL

## 2021-04-23 PROCEDURE — 0012A PR COVID VAC MODERNA 100 MCG/0.5 ML IM: CPT

## 2021-04-23 PROCEDURE — 91301 PR COVID VAC MODERNA 100 MCG/0.5 ML IM: CPT

## 2021-06-08 DIAGNOSIS — Z98.890 STATUS POST KNEE SURGERY: Primary | ICD-10-CM

## 2021-06-08 RX ORDER — CLINDAMYCIN HCL 300 MG
CAPSULE ORAL
Qty: 2 CAPSULE | Refills: 4 | Status: SHIPPED | OUTPATIENT
Start: 2021-06-08

## 2021-09-19 ENCOUNTER — HEALTH MAINTENANCE LETTER (OUTPATIENT)
Age: 66
End: 2021-09-19

## 2021-11-14 ENCOUNTER — HEALTH MAINTENANCE LETTER (OUTPATIENT)
Age: 66
End: 2021-11-14

## 2022-01-09 ENCOUNTER — HEALTH MAINTENANCE LETTER (OUTPATIENT)
Age: 67
End: 2022-01-09

## 2022-03-06 ENCOUNTER — HEALTH MAINTENANCE LETTER (OUTPATIENT)
Age: 67
End: 2022-03-06

## 2022-11-21 ENCOUNTER — HEALTH MAINTENANCE LETTER (OUTPATIENT)
Age: 67
End: 2022-11-21

## 2023-04-12 NOTE — PROGRESS NOTES
Subjective:                                       Pertinent medical history includes:  Osteoarthritis, migraines and other (pain at night/rest).    Surgical history: cervical spine.  Current medications:  Anti-depressants.  Current occupation is .    Primary job tasks include:  Prolonged sitting, lifting, repetitive tasks and other (pushing/pulling).                              Objective:    System    Physical Exam    General     ROS    Assessment/Plan:                 Medical Necessity Clause: This procedure was medically necessary because the lesions that were treated were:

## 2023-04-16 ENCOUNTER — HEALTH MAINTENANCE LETTER (OUTPATIENT)
Age: 68
End: 2023-04-16

## 2023-11-25 ENCOUNTER — HEALTH MAINTENANCE LETTER (OUTPATIENT)
Age: 68
End: 2023-11-25

## 2024-01-10 ENCOUNTER — TRANSFERRED RECORDS (OUTPATIENT)
Dept: HEALTH INFORMATION MANAGEMENT | Facility: CLINIC | Age: 69
End: 2024-01-10
Payer: COMMERCIAL

## 2024-01-12 RX ORDER — FLUOXETINE 10 MG/1
CAPSULE ORAL
Status: ON HOLD | COMMUNITY
Start: 2023-11-24 | End: 2024-08-14

## 2024-01-12 RX ORDER — PROGESTERONE 200 MG/1
200 CAPSULE ORAL
COMMUNITY
Start: 2022-02-01

## 2024-01-12 RX ORDER — TESTOSTERONE 75 MG/1
PELLET SUBCUTANEOUS
COMMUNITY

## 2024-01-17 ENCOUNTER — ANESTHESIA EVENT (OUTPATIENT)
Dept: SURGERY | Facility: AMBULATORY SURGERY CENTER | Age: 69
End: 2024-01-17
Payer: COMMERCIAL

## 2024-01-18 ENCOUNTER — HOSPITAL ENCOUNTER (OUTPATIENT)
Facility: AMBULATORY SURGERY CENTER | Age: 69
Discharge: HOME | End: 2024-01-18
Attending: OBSTETRICS & GYNECOLOGY
Payer: COMMERCIAL

## 2024-01-18 ENCOUNTER — ANESTHESIA (OUTPATIENT)
Dept: SURGERY | Facility: AMBULATORY SURGERY CENTER | Age: 69
End: 2024-01-18
Payer: COMMERCIAL

## 2024-01-18 VITALS
RESPIRATION RATE: 16 BRPM | SYSTOLIC BLOOD PRESSURE: 115 MMHG | BODY MASS INDEX: 18.48 KG/M2 | HEIGHT: 66 IN | WEIGHT: 115 LBS | TEMPERATURE: 97.7 F | DIASTOLIC BLOOD PRESSURE: 57 MMHG | HEART RATE: 56 BPM | OXYGEN SATURATION: 98 %

## 2024-01-18 DIAGNOSIS — D25.0 FIBROIDS, SUBMUCOSAL: ICD-10-CM

## 2024-01-18 DIAGNOSIS — N95.0 PMB (POSTMENOPAUSAL BLEEDING): ICD-10-CM

## 2024-01-18 RX ORDER — KETOROLAC TROMETHAMINE 30 MG/ML
INJECTION, SOLUTION INTRAMUSCULAR; INTRAVENOUS PRN
Status: DISCONTINUED | OUTPATIENT
Start: 2024-01-18 | End: 2024-01-18

## 2024-01-18 RX ORDER — PROPOFOL 10 MG/ML
INJECTION, EMULSION INTRAVENOUS CONTINUOUS PRN
Status: DISCONTINUED | OUTPATIENT
Start: 2024-01-18 | End: 2024-01-18

## 2024-01-18 RX ORDER — OXYCODONE HYDROCHLORIDE 10 MG/1
10 TABLET ORAL
Status: DISCONTINUED | OUTPATIENT
Start: 2024-01-18 | End: 2024-01-19 | Stop reason: HOSPADM

## 2024-01-18 RX ORDER — LIDOCAINE HYDROCHLORIDE 10 MG/ML
INJECTION, SOLUTION INFILTRATION; PERINEURAL PRN
Status: DISCONTINUED | OUTPATIENT
Start: 2024-01-18 | End: 2024-01-18

## 2024-01-18 RX ORDER — OXYCODONE HYDROCHLORIDE 5 MG/1
5 TABLET ORAL
Status: DISCONTINUED | OUTPATIENT
Start: 2024-01-18 | End: 2024-01-19 | Stop reason: HOSPADM

## 2024-01-18 RX ORDER — SODIUM CHLORIDE, SODIUM LACTATE, POTASSIUM CHLORIDE, CALCIUM CHLORIDE 600; 310; 30; 20 MG/100ML; MG/100ML; MG/100ML; MG/100ML
INJECTION, SOLUTION INTRAVENOUS CONTINUOUS
Status: DISCONTINUED | OUTPATIENT
Start: 2024-01-18 | End: 2024-01-19 | Stop reason: HOSPADM

## 2024-01-18 RX ORDER — ONDANSETRON 2 MG/ML
4 INJECTION INTRAMUSCULAR; INTRAVENOUS EVERY 30 MIN PRN
Status: DISCONTINUED | OUTPATIENT
Start: 2024-01-18 | End: 2024-01-19 | Stop reason: HOSPADM

## 2024-01-18 RX ORDER — ACETAMINOPHEN 325 MG/1
975 TABLET ORAL ONCE
Status: DISCONTINUED | OUTPATIENT
Start: 2024-01-18 | End: 2024-01-19 | Stop reason: HOSPADM

## 2024-01-18 RX ORDER — FENTANYL CITRATE 50 UG/ML
INJECTION, SOLUTION INTRAMUSCULAR; INTRAVENOUS PRN
Status: DISCONTINUED | OUTPATIENT
Start: 2024-01-18 | End: 2024-01-18

## 2024-01-18 RX ORDER — IBUPROFEN 600 MG/1
600 TABLET, FILM COATED ORAL ONCE
Status: DISCONTINUED | OUTPATIENT
Start: 2024-01-18 | End: 2024-01-19 | Stop reason: HOSPADM

## 2024-01-18 RX ORDER — ONDANSETRON 2 MG/ML
INJECTION INTRAMUSCULAR; INTRAVENOUS PRN
Status: DISCONTINUED | OUTPATIENT
Start: 2024-01-18 | End: 2024-01-18

## 2024-01-18 RX ORDER — DEXAMETHASONE SODIUM PHOSPHATE 10 MG/ML
INJECTION, SOLUTION INTRAMUSCULAR; INTRAVENOUS PRN
Status: DISCONTINUED | OUTPATIENT
Start: 2024-01-18 | End: 2024-01-18

## 2024-01-18 RX ORDER — ONDANSETRON 4 MG/1
4 TABLET, ORALLY DISINTEGRATING ORAL EVERY 30 MIN PRN
Status: DISCONTINUED | OUTPATIENT
Start: 2024-01-18 | End: 2024-01-19 | Stop reason: HOSPADM

## 2024-01-18 RX ORDER — LIDOCAINE HYDROCHLORIDE 10 MG/ML
INJECTION, SOLUTION EPIDURAL; INFILTRATION; INTRACAUDAL; PERINEURAL PRN
Status: DISCONTINUED | OUTPATIENT
Start: 2024-01-18 | End: 2024-01-18 | Stop reason: HOSPADM

## 2024-01-18 RX ORDER — ACETAMINOPHEN 325 MG/1
975 TABLET ORAL ONCE
Status: COMPLETED | OUTPATIENT
Start: 2024-01-18 | End: 2024-01-18

## 2024-01-18 RX ORDER — LIDOCAINE 40 MG/G
CREAM TOPICAL
Status: DISCONTINUED | OUTPATIENT
Start: 2024-01-18 | End: 2024-01-19 | Stop reason: HOSPADM

## 2024-01-18 RX ORDER — FENTANYL CITRATE 0.05 MG/ML
25 INJECTION, SOLUTION INTRAMUSCULAR; INTRAVENOUS
Status: DISCONTINUED | OUTPATIENT
Start: 2024-01-18 | End: 2024-01-19 | Stop reason: HOSPADM

## 2024-01-18 RX ADMIN — LIDOCAINE HYDROCHLORIDE 3 ML: 10 INJECTION, SOLUTION INFILTRATION; PERINEURAL at 07:04

## 2024-01-18 RX ADMIN — Medication 100 MCG: at 07:21

## 2024-01-18 RX ADMIN — Medication 100 MCG: at 07:35

## 2024-01-18 RX ADMIN — ONDANSETRON 4 MG: 2 INJECTION INTRAMUSCULAR; INTRAVENOUS at 07:09

## 2024-01-18 RX ADMIN — PROPOFOL 200 MCG/KG/MIN: 10 INJECTION, EMULSION INTRAVENOUS at 07:04

## 2024-01-18 RX ADMIN — FENTANYL CITRATE 25 MCG: 50 INJECTION, SOLUTION INTRAMUSCULAR; INTRAVENOUS at 07:04

## 2024-01-18 RX ADMIN — FENTANYL CITRATE 25 MCG: 50 INJECTION, SOLUTION INTRAMUSCULAR; INTRAVENOUS at 07:11

## 2024-01-18 RX ADMIN — Medication 100 MCG: at 07:30

## 2024-01-18 RX ADMIN — FENTANYL CITRATE 25 MCG: 50 INJECTION, SOLUTION INTRAMUSCULAR; INTRAVENOUS at 07:16

## 2024-01-18 RX ADMIN — Medication 100 MCG: at 07:27

## 2024-01-18 RX ADMIN — KETOROLAC TROMETHAMINE 15 MG: 30 INJECTION, SOLUTION INTRAMUSCULAR; INTRAVENOUS at 07:39

## 2024-01-18 RX ADMIN — FENTANYL CITRATE 25 MCG: 50 INJECTION, SOLUTION INTRAMUSCULAR; INTRAVENOUS at 07:14

## 2024-01-18 RX ADMIN — SODIUM CHLORIDE, SODIUM LACTATE, POTASSIUM CHLORIDE, CALCIUM CHLORIDE: 600; 310; 30; 20 INJECTION, SOLUTION INTRAVENOUS at 06:46

## 2024-01-18 RX ADMIN — DEXAMETHASONE SODIUM PHOSPHATE 10 MG: 10 INJECTION, SOLUTION INTRAMUSCULAR; INTRAVENOUS at 07:09

## 2024-01-18 RX ADMIN — Medication 100 MCG: at 07:39

## 2024-01-18 RX ADMIN — ACETAMINOPHEN 975 MG: 325 TABLET ORAL at 06:47

## 2024-01-18 NOTE — ANESTHESIA CARE TRANSFER NOTE
Patient: Marita Mata    Procedure: Procedure(s):  HYSTEROSCOPY, WITH DILATION AND CURETTAGE, MYOMECTOMY       Diagnosis: PMB (postmenopausal bleeding) [N95.0]  Fibroids, submucosal [D25.0]  Diagnosis Additional Information: No value filed.    Anesthesia Type:   MAC     Note:    Oropharynx: oropharynx clear of all foreign objects  Level of Consciousness: awake  Oxygen Supplementation: room air    Independent Airway: airway patency satisfactory and stable        Patient transferred to: Phase II    Handoff Report: Identifed the Patient, Identified the Reponsible Provider, Reviewed the pertinent medical history, Discussed the surgical course, Reviewed Intra-OP anesthesia mangement and issues during anesthesia, Set expectations for post-procedure period and Allowed opportunity for questions and acknowledgement of understanding      Vitals:  Vitals Value Taken Time   BP 96/54    Temp     Pulse 70    Resp 16    SpO2 99        Electronically Signed By: Sasha Echavarria MD  January 18, 2024  7:50 AM

## 2024-01-18 NOTE — ANESTHESIA PREPROCEDURE EVALUATION
Anesthesia Pre-Procedure Evaluation    Patient: Marita Mata   MRN: [id number] : 1955        Procedure : Procedure(s):  HYSTEROSCOPY, WITH DILATION AND CURETTAGE, POSSIBLE MYOMECTOMY          Past Medical History:   Diagnosis Date     Anxiety      Chronic pain      Dyspnea on exertion      History of angina      History of DVT (deep vein thrombosis)      Irregular heart beat      Migraines      Multinodular goiter      Osteoarthritis      Other chronic pain      Thrombosis       Past Surgical History:   Procedure Laterality Date     ARTHROPLASTY KNEE Right 2018    Procedure: ARTHROPLASTY KNEE;  Right Total Knee Arthroplasty ;  Surgeon: Lupe Gutierrez MD;  Location: [facility] OR     CYSTOSCOPY       ORTHOPEDIC SURGERY      Toe     ORTHOPEDIC SURGERY      Foot     SEPTOPLASTY       SPINE SURGERY      Cervical fusion     TONSILLECTOMY       ZZC ORAL SURGERY PROCEDURE      Upper jaw surgery      Allergies   Allergen Reactions     Cefuroxime Swelling     Facial swelling     Cephalexin Swelling     Cephalosporins      Facial swelling     Morphine Nausea     Penicillins Other (See Comments)     Tingling lips after penicilllin after dental procedure        Social History     Tobacco Use     Smoking status: Former     Packs/day: 1.00     Years: 3.00     Additional pack years: 0.00     Total pack years: 3.00     Types: Cigarettes     Quit date:      Years since quittin.0     Smokeless tobacco: Never   Substance Use Topics     Alcohol use: Yes     Comment: 10 Glasses of wine per week. Varies Too much recently 2023 cutting back      Wt Readings from Last 1 Encounters:   24 52.2 kg (115 lb)        Anesthesia Evaluation            ROS/MED HX  ENT/Pulmonary:  - neg pulmonary ROS     Neurologic:  - neg neurologic ROS     Cardiovascular:     (+)  - -   -  - -           SHIPLEY.                           METS/Exercise Tolerance:     Hematologic:     (+) History of blood clots,              "  Musculoskeletal:       GI/Hepatic:  - neg GI/hepatic ROS     Renal/Genitourinary:  - neg Renal ROS     Endo:  - neg endo ROS     Psychiatric/Substance Use:     (+) psychiatric history anxiety       Infectious Disease:  - neg infectious disease ROS     Malignancy:       Other:      (+)  , H/O Chronic Pain,         Physical Exam    Airway  airway exam normal      Mallampati: I   TM distance: > 3 FB   Neck ROM: full   Mouth opening: > 3 cm    Respiratory Devices and Support         Dental     Comment: Invisalign present         Cardiovascular   cardiovascular exam normal          Pulmonary   pulmonary exam normal              OUTSIDE LABS:  CBC:   Lab Results   Component Value Date    WBC 6.2 08/08/2018    HGB 10.9 (L) 08/25/2018    HGB 10.8 (L) 08/24/2018    HCT 42.9 08/08/2018     08/08/2018     BMP:   Lab Results   Component Value Date     08/08/2018    POTASSIUM 4.0 08/08/2018    CHLORIDE 98 08/08/2018    CO2 29 08/08/2018    BUN 15 08/08/2018    CR 0.65 08/08/2018    GLC 90 08/08/2018     COAGS:   Lab Results   Component Value Date    INR 1.09 08/24/2018     POC:   Lab Results   Component Value Date     (H) 08/23/2018     HEPATIC: No results found for: \"ALBUMIN\", \"PROTTOTAL\", \"ALT\", \"AST\", \"GGT\", \"ALKPHOS\", \"BILITOTAL\", \"BILIDIRECT\", \"MARC\"  OTHER:   Lab Results   Component Value Date    NIURKA 9.8 08/08/2018       Anesthesia Plan    ASA Status:  2    NPO Status:  NPO Appropriate    Anesthesia Type: MAC.     - Reason for MAC: immobility needed, straight local not clinically adequate              Consents    Anesthesia Plan(s) and associated risks, benefits, and realistic alternatives discussed. Questions answered and patient/representative(s) expressed understanding.     - Discussed:     - Discussed with:  Patient, Spouse      - Extended Intubation/Ventilatory Support Discussed: No.      - Patient is DNR/DNI Status: No          Postoperative Care    Pain management: Multi-modal analgesia. "   PONV prophylaxis: Ondansetron (or other 5HT-3), Dexamethasone or Solumedrol     Comments:               Sasha Echavarria MD    I have reviewed the pertinent notes and labs in the chart from the past 30 days and (re)examined the patient.  Any updates or changes from those notes are reflected in this note.

## 2024-01-18 NOTE — DISCHARGE INSTRUCTIONS
You have received 975 mg of Acetaminophen (Tylenol) at 6:47AM. Please do not take an additional dose of Tylenol until after 12:47PM.     Do not exceed 4,000 mg of acetaminophen during a 24 hour period and keep in mind that acetaminophen can also be found in many over-the-counter cold medications as well as narcotics that may be given for pain.     You received a dose of IV Toradol at 7:39AM. Please do not take any additional Ibuprofen/NSAID products (Aleve/Advil/Motrin/Naproxen/Celebrex) until after 1:39PM.      If you have any questions or concerns regarding your procedure, please contact Dr. Merlos, her office number is [phone].

## 2024-01-18 NOTE — OP NOTE
Hysteroscopy, D&C    Name: Marita Mata   MRN: [id number]   DATE OF SERVICE:  1/18/2024     PREOPERATIVE DIAGNOSIS: Recurrent postmenopausal bleeding on hormone therapy with submucosal fibroid    POSTOPERATIVE DIAGNOSIS: Same    PROCEDURES: Hysteroscopy, myomectomy dilatation and curettage,       ANESTHESIA: Local and MAC, total of 10 cc of 1% lidocaine plain      ESTIMATED BLOOD LOSS:   10 ml    Findings: There is a large submucosal fibroid indenting into the endometrial cavity.  It arose from the fundal right side.  Majority of this was removed with the MyoSure XL device.  Rest the endometrium within normal limits.      HISTORY OF PRESENT ILLNESS:  This is a 68 year old year old female with history of recurrent postmenopausal bleeding on hormone therapy.  She does have a normal endometrial biopsy August 2023 which was normal.  She had recurrent of bleeding and endometrium was thickened with displacement by a 2.45 cm fibroid.  Comorbidities include osteoporosis for which she is on Fosamax and estrogen.  She also uses testosterone and progesterone.      She was given informed consent for the above procedure. The risks, benefits and alternatives were discussed with her. She expressed understanding and wished to proceed.       PROCEDURE:  The patient was brought to the operating room and after induction of general anesthesia was prepped and draped in the dorsal lithotomy position. A time out was called and the patient and the procedure were verified.      A sterile weighted speculum was placed. The anterior lip of the cervix were grasped with single tooth tenaculum. Uterus was then sounded to 8cm. The cervix was gently dilated using Gloria dilators and the hysteroscope was introduced.   Cavity of the uterus was noted to have a bulge arising from the fundal right side.  Initially we used the MyoSure reach device but the fibroid was firm therefore we changed to the XL device and the fibroid was removed almost in its  entirety.  Sampling of the endometrium was also done with the MyoSure device.      At this point endometrial curettings were obtained. In each case all quadrants were sampled, and the tissue was submitted for pathologic exam. At this point good hemostasis was noted with this portion of the procedure. Instruments were removed from vagina. No active bleeding was noted. Sponge and needle counts were correct X 2. She was taken to recovery in stable condition.      Marissa Merlos MD

## 2024-01-18 NOTE — ANESTHESIA POSTPROCEDURE EVALUATION
Patient: Marita Mata    Procedure: Procedure(s):  HYSTEROSCOPY, WITH DILATION AND CURETTAGE, MYOMECTOMY       Anesthesia Type:  MAC    Note:  Disposition: Outpatient   Postop Pain Control: Uneventful            Sign Out: Well controlled pain   PONV: No   Neuro/Psych: Uneventful            Sign Out: Acceptable/Baseline neuro status   Airway/Respiratory: Uneventful            Sign Out: AIRWAY IN SITU/Resp. Support   CV/Hemodynamics: Uneventful            Sign Out: Acceptable CV status; No obvious hypovolemia; No obvious fluid overload   Other NRE: NONE   DID A NON-ROUTINE EVENT OCCUR?            Last vitals:  Vitals Value Taken Time   /57 01/18/24 0815   Temp 97.7  F (36.5  C) 01/18/24 0747   Pulse 54 01/18/24 0822   Resp 16 01/18/24 0747   SpO2 99 % 01/18/24 0822   Vitals shown include unfiled device data.    Electronically Signed By: Sasha Echavarria MD  January 18, 2024  8:25 AM

## 2024-01-18 NOTE — H&P
H&P reviewed and no changes identified.  Reviewed risks of procedure in detail. Will proceed.  Marissa Merlos MD

## 2024-03-05 ENCOUNTER — TRANSFERRED RECORDS (OUTPATIENT)
Dept: HEALTH INFORMATION MANAGEMENT | Facility: CLINIC | Age: 69
End: 2024-03-05

## 2024-06-22 ENCOUNTER — HEALTH MAINTENANCE LETTER (OUTPATIENT)
Age: 69
End: 2024-06-22

## 2024-08-13 ENCOUNTER — ANESTHESIA EVENT (OUTPATIENT)
Dept: SURGERY | Facility: HOSPITAL | Age: 69
End: 2024-08-13
Payer: MEDICARE

## 2024-08-13 RX ORDER — CYCLOBENZAPRINE HCL 10 MG
10 TABLET ORAL 3 TIMES DAILY PRN
COMMUNITY

## 2024-08-13 RX ORDER — CLINDAMYCIN PHOSPHATE 900 MG/50ML
900 INJECTION, SOLUTION INTRAVENOUS
Status: CANCELLED | OUTPATIENT
Start: 2024-08-13

## 2024-08-13 RX ORDER — IPRATROPIUM BROMIDE 21 UG/1
2 SPRAY, METERED NASAL 3 TIMES DAILY
COMMUNITY

## 2024-08-13 RX ORDER — LORATADINE 10 MG/1
TABLET ORAL
COMMUNITY

## 2024-08-13 RX ORDER — LIDOCAINE 50 MG/G
PATCH TOPICAL EVERY 24 HOURS
COMMUNITY

## 2024-08-13 RX ORDER — MULTIVITAMIN WITH IRON
1 TABLET ORAL DAILY
COMMUNITY

## 2024-08-13 RX ORDER — VENLAFAXINE 37.5 MG/1
37.5 TABLET ORAL DAILY
COMMUNITY

## 2024-08-13 RX ORDER — ESTRADIOL 0.1 MG/G
CREAM VAGINAL PRN
COMMUNITY

## 2024-08-13 RX ORDER — CLINDAMYCIN PHOSPHATE 900 MG/50ML
900 INJECTION, SOLUTION INTRAVENOUS SEE ADMIN INSTRUCTIONS
Status: CANCELLED | OUTPATIENT
Start: 2024-08-13

## 2024-08-14 ENCOUNTER — HOSPITAL ENCOUNTER (OUTPATIENT)
Facility: HOSPITAL | Age: 69
Discharge: HOME | End: 2024-08-14
Attending: OBSTETRICS & GYNECOLOGY | Admitting: OBSTETRICS & GYNECOLOGY
Payer: MEDICARE

## 2024-08-14 ENCOUNTER — ANESTHESIA (OUTPATIENT)
Dept: SURGERY | Facility: HOSPITAL | Age: 69
End: 2024-08-14
Payer: MEDICARE

## 2024-08-14 VITALS
HEART RATE: 64 BPM | SYSTOLIC BLOOD PRESSURE: 145 MMHG | WEIGHT: 116 LBS | OXYGEN SATURATION: 100 % | RESPIRATION RATE: 20 BRPM | TEMPERATURE: 97 F | DIASTOLIC BLOOD PRESSURE: 65 MMHG | BODY MASS INDEX: 18.87 KG/M2

## 2024-08-14 DIAGNOSIS — Z90.710 H/O: HYSTERECTOMY: Primary | ICD-10-CM

## 2024-08-14 LAB
ABO/RH(D): NORMAL
ANTIBODY SCREEN: NEGATIVE
HCG UR QL: NEGATIVE
HOLD SPECIMEN: NORMAL
SPECIMEN EXPIRATION DATE: NORMAL

## 2024-08-14 PROCEDURE — 272N000001 HC OR GENERAL SUPPLY STERILE: Performed by: OBSTETRICS & GYNECOLOGY

## 2024-08-14 PROCEDURE — 999N000141 HC STATISTIC PRE-PROCEDURE NURSING ASSESSMENT: Performed by: OBSTETRICS & GYNECOLOGY

## 2024-08-14 PROCEDURE — 88307 TISSUE EXAM BY PATHOLOGIST: CPT | Mod: TC | Performed by: OBSTETRICS & GYNECOLOGY

## 2024-08-14 PROCEDURE — 360N000080 HC SURGERY LEVEL 7, PER MIN: Performed by: OBSTETRICS & GYNECOLOGY

## 2024-08-14 PROCEDURE — 250N000025 HC SEVOFLURANE, PER MIN: Performed by: OBSTETRICS & GYNECOLOGY

## 2024-08-14 PROCEDURE — 58571 TLH W/T/O 250 G OR LESS: CPT | Performed by: ANESTHESIOLOGY

## 2024-08-14 PROCEDURE — 250N000011 HC RX IP 250 OP 636: Performed by: ANESTHESIOLOGY

## 2024-08-14 PROCEDURE — 250N000009 HC RX 250: Performed by: ANESTHESIOLOGY

## 2024-08-14 PROCEDURE — 710N000012 HC RECOVERY PHASE 2, PER MINUTE: Performed by: OBSTETRICS & GYNECOLOGY

## 2024-08-14 PROCEDURE — 258N000003 HC RX IP 258 OP 636: Performed by: ANESTHESIOLOGY

## 2024-08-14 PROCEDURE — 250N000011 HC RX IP 250 OP 636: Performed by: OBSTETRICS & GYNECOLOGY

## 2024-08-14 PROCEDURE — 86900 BLOOD TYPING SEROLOGIC ABO: CPT | Performed by: NURSE PRACTITIONER

## 2024-08-14 PROCEDURE — 250N000009 HC RX 250: Performed by: OBSTETRICS & GYNECOLOGY

## 2024-08-14 PROCEDURE — 88307 TISSUE EXAM BY PATHOLOGIST: CPT | Mod: 26 | Performed by: PATHOLOGY

## 2024-08-14 PROCEDURE — 258N000003 HC RX IP 258 OP 636: Performed by: OBSTETRICS & GYNECOLOGY

## 2024-08-14 PROCEDURE — 58571 TLH W/T/O 250 G OR LESS: CPT | Performed by: NURSE ANESTHETIST, CERTIFIED REGISTERED

## 2024-08-14 PROCEDURE — 710N000009 HC RECOVERY PHASE 1, LEVEL 1, PER MIN: Performed by: OBSTETRICS & GYNECOLOGY

## 2024-08-14 PROCEDURE — 370N000017 HC ANESTHESIA TECHNICAL FEE, PER MIN: Performed by: OBSTETRICS & GYNECOLOGY

## 2024-08-14 PROCEDURE — 36415 COLL VENOUS BLD VENIPUNCTURE: CPT | Performed by: NURSE PRACTITIONER

## 2024-08-14 PROCEDURE — 81025 URINE PREGNANCY TEST: CPT | Performed by: NURSE PRACTITIONER

## 2024-08-14 PROCEDURE — 250N000013 HC RX MED GY IP 250 OP 250 PS 637: Performed by: NURSE PRACTITIONER

## 2024-08-14 RX ORDER — OXYCODONE HYDROCHLORIDE 5 MG/1
5 TABLET ORAL
Status: DISCONTINUED | OUTPATIENT
Start: 2024-08-14 | End: 2024-08-14 | Stop reason: HOSPADM

## 2024-08-14 RX ORDER — PROPOFOL 10 MG/ML
INJECTION, EMULSION INTRAVENOUS CONTINUOUS PRN
Status: DISCONTINUED | OUTPATIENT
Start: 2024-08-14 | End: 2024-08-14

## 2024-08-14 RX ORDER — APREPITANT 40 MG/1
40 CAPSULE ORAL ONCE
Status: CANCELLED | OUTPATIENT
Start: 2024-08-14 | End: 2024-08-14

## 2024-08-14 RX ORDER — SODIUM CHLORIDE, SODIUM LACTATE, POTASSIUM CHLORIDE, AND CALCIUM CHLORIDE .6; .31; .03; .02 G/100ML; G/100ML; G/100ML; G/100ML
IRRIGANT IRRIGATION PRN
Status: DISCONTINUED | OUTPATIENT
Start: 2024-08-14 | End: 2024-08-14 | Stop reason: HOSPADM

## 2024-08-14 RX ORDER — IBUPROFEN 200 MG
600 TABLET ORAL ONCE
Status: DISCONTINUED | OUTPATIENT
Start: 2024-08-14 | End: 2024-08-14 | Stop reason: HOSPADM

## 2024-08-14 RX ORDER — ONDANSETRON 4 MG/1
4 TABLET, ORALLY DISINTEGRATING ORAL EVERY 30 MIN PRN
Status: DISCONTINUED | OUTPATIENT
Start: 2024-08-14 | End: 2024-08-14 | Stop reason: HOSPADM

## 2024-08-14 RX ORDER — SODIUM CHLORIDE, SODIUM LACTATE, POTASSIUM CHLORIDE, CALCIUM CHLORIDE 600; 310; 30; 20 MG/100ML; MG/100ML; MG/100ML; MG/100ML
INJECTION, SOLUTION INTRAVENOUS CONTINUOUS PRN
Status: DISCONTINUED | OUTPATIENT
Start: 2024-08-14 | End: 2024-08-14

## 2024-08-14 RX ORDER — ONDANSETRON 2 MG/ML
INJECTION INTRAMUSCULAR; INTRAVENOUS PRN
Status: DISCONTINUED | OUTPATIENT
Start: 2024-08-14 | End: 2024-08-14

## 2024-08-14 RX ORDER — ACETAMINOPHEN 325 MG/1
975 TABLET ORAL ONCE
Status: COMPLETED | OUTPATIENT
Start: 2024-08-14 | End: 2024-08-14

## 2024-08-14 RX ORDER — DEXMEDETOMIDINE HYDROCHLORIDE 4 UG/ML
INJECTION, SOLUTION INTRAVENOUS
Status: DISCONTINUED
Start: 2024-08-14 | End: 2024-08-14 | Stop reason: HOSPADM

## 2024-08-14 RX ORDER — FENTANYL CITRATE 50 UG/ML
INJECTION, SOLUTION INTRAMUSCULAR; INTRAVENOUS PRN
Status: DISCONTINUED | OUTPATIENT
Start: 2024-08-14 | End: 2024-08-14

## 2024-08-14 RX ORDER — ONDANSETRON 2 MG/ML
4 INJECTION INTRAMUSCULAR; INTRAVENOUS EVERY 30 MIN PRN
Status: DISCONTINUED | OUTPATIENT
Start: 2024-08-14 | End: 2024-08-14 | Stop reason: HOSPADM

## 2024-08-14 RX ORDER — NALOXONE HYDROCHLORIDE 0.4 MG/ML
0.1 INJECTION, SOLUTION INTRAMUSCULAR; INTRAVENOUS; SUBCUTANEOUS
Status: DISCONTINUED | OUTPATIENT
Start: 2024-08-14 | End: 2024-08-14 | Stop reason: HOSPADM

## 2024-08-14 RX ORDER — PROPOFOL 10 MG/ML
INJECTION, EMULSION INTRAVENOUS PRN
Status: DISCONTINUED | OUTPATIENT
Start: 2024-08-14 | End: 2024-08-14

## 2024-08-14 RX ORDER — BUPIVACAINE HYDROCHLORIDE 2.5 MG/ML
INJECTION, SOLUTION INFILTRATION; PERINEURAL PRN
Status: DISCONTINUED | OUTPATIENT
Start: 2024-08-14 | End: 2024-08-14 | Stop reason: HOSPADM

## 2024-08-14 RX ORDER — OXYCODONE HYDROCHLORIDE 5 MG/1
5 TABLET ORAL EVERY 6 HOURS PRN
Qty: 8 TABLET | Refills: 0 | Status: SHIPPED | OUTPATIENT
Start: 2024-08-14 | End: 2024-08-17

## 2024-08-14 RX ORDER — LABETALOL HYDROCHLORIDE 5 MG/ML
5 INJECTION, SOLUTION INTRAVENOUS EVERY 10 MIN PRN
Status: DISCONTINUED | OUTPATIENT
Start: 2024-08-14 | End: 2024-08-14 | Stop reason: HOSPADM

## 2024-08-14 RX ORDER — MAGNESIUM SULFATE 4 G/50ML
4 INJECTION INTRAVENOUS ONCE
Status: COMPLETED | OUTPATIENT
Start: 2024-08-14 | End: 2024-08-14

## 2024-08-14 RX ORDER — LIDOCAINE HYDROCHLORIDE 10 MG/ML
INJECTION, SOLUTION INFILTRATION; PERINEURAL PRN
Status: DISCONTINUED | OUTPATIENT
Start: 2024-08-14 | End: 2024-08-14

## 2024-08-14 RX ORDER — LIDOCAINE 40 MG/G
CREAM TOPICAL
Status: DISCONTINUED | OUTPATIENT
Start: 2024-08-14 | End: 2024-08-14 | Stop reason: HOSPADM

## 2024-08-14 RX ORDER — CEFAZOLIN SODIUM 1 G/3ML
INJECTION, POWDER, FOR SOLUTION INTRAMUSCULAR; INTRAVENOUS PRN
Status: DISCONTINUED | OUTPATIENT
Start: 2024-08-14 | End: 2024-08-14

## 2024-08-14 RX ORDER — DEXAMETHASONE SODIUM PHOSPHATE 4 MG/ML
4 INJECTION, SOLUTION INTRA-ARTICULAR; INTRALESIONAL; INTRAMUSCULAR; INTRAVENOUS; SOFT TISSUE
Status: DISCONTINUED | OUTPATIENT
Start: 2024-08-14 | End: 2024-08-14 | Stop reason: HOSPADM

## 2024-08-14 RX ORDER — CEFAZOLIN SODIUM/WATER 2 G/20 ML
2 SYRINGE (ML) INTRAVENOUS SEE ADMIN INSTRUCTIONS
Status: DISCONTINUED | OUTPATIENT
Start: 2024-08-14 | End: 2024-08-14

## 2024-08-14 RX ORDER — ACETAMINOPHEN 325 MG/1
975 TABLET ORAL ONCE
Status: DISCONTINUED | OUTPATIENT
Start: 2024-08-14 | End: 2024-08-14 | Stop reason: HOSPADM

## 2024-08-14 RX ORDER — METRONIDAZOLE 500 MG/100ML
500 INJECTION, SOLUTION INTRAVENOUS
Status: COMPLETED | OUTPATIENT
Start: 2024-08-14 | End: 2024-08-14

## 2024-08-14 RX ORDER — OXYCODONE HYDROCHLORIDE 5 MG/1
10 TABLET ORAL
Status: DISCONTINUED | OUTPATIENT
Start: 2024-08-14 | End: 2024-08-14 | Stop reason: HOSPADM

## 2024-08-14 RX ORDER — HYDROMORPHONE HYDROCHLORIDE 1 MG/ML
0.5 INJECTION, SOLUTION INTRAMUSCULAR; INTRAVENOUS; SUBCUTANEOUS EVERY 5 MIN PRN
Status: DISCONTINUED | OUTPATIENT
Start: 2024-08-14 | End: 2024-08-14 | Stop reason: HOSPADM

## 2024-08-14 RX ORDER — CEFAZOLIN SODIUM/WATER 2 G/20 ML
2 SYRINGE (ML) INTRAVENOUS
Status: DISCONTINUED | OUTPATIENT
Start: 2024-08-14 | End: 2024-08-14

## 2024-08-14 RX ORDER — KETAMINE HYDROCHLORIDE 10 MG/ML
INJECTION INTRAMUSCULAR; INTRAVENOUS PRN
Status: DISCONTINUED | OUTPATIENT
Start: 2024-08-14 | End: 2024-08-14

## 2024-08-14 RX ORDER — DEXAMETHASONE SODIUM PHOSPHATE 10 MG/ML
4 INJECTION, SOLUTION INTRAMUSCULAR; INTRAVENOUS
Status: DISCONTINUED | OUTPATIENT
Start: 2024-08-14 | End: 2024-08-14 | Stop reason: HOSPADM

## 2024-08-14 RX ORDER — HYDROMORPHONE HCL IN WATER/PF 6 MG/30 ML
0.2 PATIENT CONTROLLED ANALGESIA SYRINGE INTRAVENOUS EVERY 5 MIN PRN
Status: DISCONTINUED | OUTPATIENT
Start: 2024-08-14 | End: 2024-08-14 | Stop reason: HOSPADM

## 2024-08-14 RX ORDER — SODIUM CHLORIDE, SODIUM LACTATE, POTASSIUM CHLORIDE, CALCIUM CHLORIDE 600; 310; 30; 20 MG/100ML; MG/100ML; MG/100ML; MG/100ML
INJECTION, SOLUTION INTRAVENOUS CONTINUOUS
Status: DISCONTINUED | OUTPATIENT
Start: 2024-08-14 | End: 2024-08-14 | Stop reason: HOSPADM

## 2024-08-14 RX ORDER — FENTANYL CITRATE 50 UG/ML
25 INJECTION, SOLUTION INTRAMUSCULAR; INTRAVENOUS
Status: DISCONTINUED | OUTPATIENT
Start: 2024-08-14 | End: 2024-08-14 | Stop reason: HOSPADM

## 2024-08-14 RX ADMIN — PROPOFOL 120 MG: 10 INJECTION, EMULSION INTRAVENOUS at 08:12

## 2024-08-14 RX ADMIN — PROPOFOL 120 MCG/KG/MIN: 10 INJECTION, EMULSION INTRAVENOUS at 08:17

## 2024-08-14 RX ADMIN — DEXMEDETOMIDINE HYDROCHLORIDE 16 MCG: 100 INJECTION, SOLUTION INTRAVENOUS at 08:10

## 2024-08-14 RX ADMIN — KETAMINE HYDROCHLORIDE 50 MG: 10 INJECTION INTRAMUSCULAR; INTRAVENOUS at 08:12

## 2024-08-14 RX ADMIN — ROCURONIUM BROMIDE 50 MG: 50 INJECTION, SOLUTION INTRAVENOUS at 08:12

## 2024-08-14 RX ADMIN — SODIUM CHLORIDE, POTASSIUM CHLORIDE, SODIUM LACTATE AND CALCIUM CHLORIDE: 600; 310; 30; 20 INJECTION, SOLUTION INTRAVENOUS at 07:37

## 2024-08-14 RX ADMIN — ONDANSETRON 4 MG: 2 INJECTION INTRAMUSCULAR; INTRAVENOUS at 09:48

## 2024-08-14 RX ADMIN — DEXAMETHASONE SODIUM PHOSPHATE 10 MG: 10 INJECTION, SOLUTION INTRAMUSCULAR; INTRAVENOUS at 08:34

## 2024-08-14 RX ADMIN — SUGAMMADEX 150 MG: 100 INJECTION, SOLUTION INTRAVENOUS at 09:29

## 2024-08-14 RX ADMIN — HYDROMORPHONE HYDROCHLORIDE 1 MG: 1 INJECTION, SOLUTION INTRAMUSCULAR; INTRAVENOUS; SUBCUTANEOUS at 08:10

## 2024-08-14 RX ADMIN — METRONIDAZOLE 500 MG: 5 INJECTION, SOLUTION INTRAVENOUS at 07:38

## 2024-08-14 RX ADMIN — PHENYLEPHRINE HYDROCHLORIDE 100 MCG: 10 INJECTION INTRAVENOUS at 08:13

## 2024-08-14 RX ADMIN — MAGNESIUM SULFATE HEPTAHYDRATE 4 G: 80 INJECTION, SOLUTION INTRAVENOUS at 07:39

## 2024-08-14 RX ADMIN — MIDAZOLAM 2 MG: 1 INJECTION INTRAMUSCULAR; INTRAVENOUS at 08:00

## 2024-08-14 RX ADMIN — FENTANYL CITRATE 50 MCG: 50 INJECTION INTRAMUSCULAR; INTRAVENOUS at 08:39

## 2024-08-14 RX ADMIN — SODIUM CHLORIDE, POTASSIUM CHLORIDE, SODIUM LACTATE AND CALCIUM CHLORIDE: 600; 310; 30; 20 INJECTION, SOLUTION INTRAVENOUS at 08:00

## 2024-08-14 RX ADMIN — FENTANYL CITRATE 50 MCG: 50 INJECTION INTRAMUSCULAR; INTRAVENOUS at 08:43

## 2024-08-14 RX ADMIN — LIDOCAINE HYDROCHLORIDE 50 MG: 10 INJECTION, SOLUTION INFILTRATION; PERINEURAL at 08:10

## 2024-08-14 RX ADMIN — SODIUM CHLORIDE, POTASSIUM CHLORIDE, SODIUM LACTATE AND CALCIUM CHLORIDE: 600; 310; 30; 20 INJECTION, SOLUTION INTRAVENOUS at 10:35

## 2024-08-14 RX ADMIN — ACETAMINOPHEN 975 MG: 325 TABLET ORAL at 07:12

## 2024-08-14 RX ADMIN — CEFAZOLIN 2 G: 1 INJECTION, POWDER, FOR SOLUTION INTRAMUSCULAR; INTRAVENOUS at 08:25

## 2024-08-14 ASSESSMENT — ACTIVITIES OF DAILY LIVING (ADL)
ADLS_ACUITY_SCORE: 29

## 2024-08-14 ASSESSMENT — LIFESTYLE VARIABLES: TOBACCO_USE: 1

## 2024-08-14 NOTE — ANESTHESIA POSTPROCEDURE EVALUATION
Patient: Marita Mata    Procedure: Procedure(s):  DA MORIS TOTAL LAPAROSCOPIC HYSTERECTOMY BILATERAL SALPINGO-OOPHORECTOMY, CYSTOSCOPY       Anesthesia Type:  General    Note:  Disposition: Outpatient   Postop Pain Control: Uneventful            Sign Out: Well controlled pain   PONV: No   Neuro/Psych: Uneventful            Sign Out: Acceptable/Baseline neuro status   Airway/Respiratory: Uneventful            Sign Out: Acceptable/Baseline resp. status   CV/Hemodynamics: Uneventful            Sign Out: Acceptable CV status; No obvious hypovolemia; No obvious fluid overload   Other NRE: NONE   DID A NON-ROUTINE EVENT OCCUR? No           Last vitals:  Vitals Value Taken Time   /80 08/14/24 1100   Temp 36.2  C (97.16  F) 08/14/24 1102   Pulse 63 08/14/24 1101   Resp 22 08/14/24 1101   SpO2 98 % 08/14/24 1101   Vitals shown include unfiled device data.    Electronically Signed By: Jamia Flanagan MD  August 14, 2024  1:29 PM

## 2024-08-14 NOTE — ANESTHESIA CARE TRANSFER NOTE
Patient: Marita Mata    Procedure: Procedure(s):  DA MORIS TOTAL LAPAROSCOPIC HYSTERECTOMY BILATERAL SALPINGO-OOPHORECTOMY, CYSTOSCOPY       Diagnosis: Postmenopausal bleeding [N95.0]  Diagnosis Additional Information: No value filed.    Anesthesia Type:   General     Note:    Oropharynx: oropharynx clear of all foreign objects and spontaneously breathing  Level of Consciousness: awake  Oxygen Supplementation: face mask  Level of Supplemental Oxygen (L/min / FiO2): 6  Independent Airway: airway patency satisfactory and stable  Dentition: dentition unchanged  Vital Signs Stable: post-procedure vital signs reviewed and stable  Report to RN Given: handoff report given  Patient transferred to: PACU    Handoff Report: Identifed the Patient, Identified the Reponsible Provider, Reviewed the pertinent medical history, Discussed the surgical course, Reviewed Intra-OP anesthesia mangement and issues during anesthesia, Set expectations for post-procedure period and Allowed opportunity for questions and acknowledgement of understanding    Vitals:  Vitals Value Taken Time   /68 08/14/24 0952   Temp 35.6  C (96.08  F) 08/14/24 0954   Pulse 61 08/14/24 0954   Resp 21 08/14/24 0954   SpO2 100 % 08/14/24 0954   Vitals shown include unfiled device data.    Electronically Signed By: JOSEPH Mosquera CRNA  August 14, 2024  9:55 AM

## 2024-08-14 NOTE — ANESTHESIA PROCEDURE NOTES
Airway         Procedure Start/Stop Times: 8/14/2024 8:15 AM and 8/14/2024 8:16 AM  Staff -        CRNA: Will Tolbert APRN CRNA       Performed By: CRNAIndications and Patient Condition       Indications for airway management: airway protection       Induction type:intravenous       Mask difficulty assessment: 1 - vent by mask    Final Airway Details       Final airway type: endotracheal airway       Successful airway: ETT - single  Endotracheal Airway Details        ETT size (mm): 7.0       Cuffed: yes       Successful intubation technique: direct laryngoscopy       DL Blade Type: Ramirez 2       Grade View of Cords: 1       Adjucts: stylet       Position: Center       Measured from: gums/teeth       Secured at (cm): 21       Bite block used: None    Post intubation assessment        Placement verified by: capnometry, equal breath sounds and chest rise        Number of attempts at approach: 1       Number of other approaches attempted: 0       Secured with: tape       Ease of procedure: easy       Dentition: Unchanged and Intact    Medication(s) Administered   Medication Administration Time: 8/14/2024 8:15 AM

## 2024-08-14 NOTE — OP NOTE
Name:  Marita Mata  Record # [id number]   : 1955  Care Provider: Marissa Merlos MD   Admit Date:  2024       Obstetrics Gynecology - Operative Report    DATE OF SERVICE: 2024     PREOPERATIVE DIAGNOSIS: Uterine fibroids and history of recurrent postmenopausal bleeding    Postop diagnosis: Same    PROCEDURE:   Da Oralia total laparoscopic assisted hysterectomy, bilateral salpingo-oophorectomy and cystoscopy:    FINDINGS: Slight enlargement to the uterus was noted .  Fibroids noted.    Adnexa.    Via cystoscopy normal bladder with eeflux of urine from both ureters at the completion of the procedure.    PHYSICIAN:  Marissa Merlos MD     ASSISTANT:  Dannielle Mehta     ESTIMATED BLOOD LOSS: 15 ml    ANESTHESIA:  General.    SPECIMENS REMOVED:  Uterus, cervix and bilateral tubes and ovaries    COMPLICATIONS: None noted.    COMMENTS: Marita Mata  was met preoperatively with her  where we discussed the procedure and the risks associated with the procedure.  She understood these to be, but not limited to injury to adjacent organs including bladder, bowel, ureter, infection and bleeding.  Patient signed consent and was brought back to the operating room in stable condition.    Patient underwent induction of a general anesthetic, she was carefully prepped and draped for the procedure. Timeout was performed. Bladder was drained with a Gandhi catheter, uterine manipulator placed into the uterus.      Attention was turned to the abdomen.  An incision was made above the umbilicus.  A Veress needle was introduced with a 2 pop technique, saline drop test confirmed adequate placement.   Opening pressure was 3.   Insufflation was now done until 15mm of pressure were established.  An 7/8 Davinci XI nonbladed trocar was placed above the umbilicus. Two other robotic assist ports were place approximately  to the LEFT of the initial incision and to the LEFT of that.    A right upper quadrant 8 nonbladed  trocar was placed    A final Davinci port was placed to 9-10cm lateral to the umbilical incision.  Trendelenburg assistance was used and the davinci was then brought to the patient s bedside.  The da Oralia was then docked.  The Vessel sealer and PK bipolar forceps were placed at the left da Oralia port sites,  the monopolar scissors placed in right side of the body and I took my turn to the PSafe Oralia console.     The procedure began with identifying the anatomy.  The uterus appeared slightly enlarged but good mobility.  There was abundant amount of stool in the colon.  The adnexa were normal..    The round ligament on the left side was cauterized and transected creating an anterior and posterior leave of the broad ligament.  The Retroperitoneal space was developed andthe ureter identified.  The IP ligament was now skeletonized away from the ureter.  It was then coagulated x 2 and cut.    This was carried out in similar fashion on the right side.    The anterior bladder flap was created.    The ureter was then identified and its course where it dives under the uterine vasculature bilaterally.  Uterine vessels were skeletonized at the uterocervical junction.  Near the colpotomy cup.   At this junction, the uterine vasculature on both sides near the uterocervical isthmus were cauterized and transected.  This cauterization and transection was continued along the cervix until the level of the cardinal ligament.    At this junction anterior colpotomy and posterior colpotomy were performed. The uterus and cervix tubes and ovaries were then delivered through the vagina.    The vaginal cuff was closed with oh V-Loc suture.  Both angles were elevated to its ipsilateral uterosacral ligament with individual figure-of-8 sutures, of O-Vicryl    At this junction, the procedure  was complete.  Sponge, lap and needle counts were correct x 2.  I took my turn to cystoscopy, noting normal ureter and bladder anatomy. Strong urine eflux  bilaterally was noted from both ureteral orfices.  The trocars were removed and the gas was removed from the abdomen.     Skin was closed with Monocryl. Steri-Strips applied.  She was brought to the recovery in stable condition.      Marissa Merlos MD

## 2024-08-14 NOTE — ANESTHESIA PREPROCEDURE EVALUATION
Anesthesia Pre-Procedure Evaluation    Patient: Marita Mata   MRN: [id number] : 1955        Procedure : Procedure(s):  DA MORIS TOTAL LAPAROSCOPIC HYSTERECTOMY BILATERAL SALPINGO-OOPHORECTOMY, CYSTOSCOPY          Past Medical History:   Diagnosis Date    Anxiety     Chronic pain     Deviated nasal septum     Dupuytren contracture     Dyspnea on exertion     History of angina     History of DVT (deep vein thrombosis)     Irregular heart beat     Migraine     Migraines     Multinodular goiter     Multinodular goiter     Myofascial pain syndrome     Osteoarthritis     Other chronic pain     Pulmonary nodules     Sciatica, unspecified laterality     Sinusitis, chronic     Thrombosis       Past Surgical History:   Procedure Laterality Date    ARTHROPLASTY KNEE Right 2018    Procedure: ARTHROPLASTY KNEE;  Right Total Knee Arthroplasty ;  Surgeon: Lupe Gutierrez MD;  Location:  OR    BACK SURGERY      CYSTOSCOPY      DILATION AND CURETTAGE, OPERATIVE HYSTEROSCOPY, COMBINED N/A 2024    Procedure: HYSTEROSCOPY, WITH DILATION AND CURETTAGE, MYOMECTOMY;  Surgeon: Marissa Merlos MD;  Location: [facility] OR    ORTHOPEDIC SURGERY      Toe    ORTHOPEDIC SURGERY      Foot    SEPTOPLASTY      SPINE SURGERY      Cervical fusion    TONSILLECTOMY      ZZC ORAL SURGERY PROCEDURE      Upper jaw surgery      Allergies   Allergen Reactions    Cefuroxime Swelling     Facial swelling    Cephalexin Swelling    Cephalosporins      Facial swelling    Morphine Nausea    Penicillins Other (See Comments)     Tingling lips after penicilllin after dental procedure        Social History     Tobacco Use    Smoking status: Former     Current packs/day: 0.00     Average packs/day: 1 pack/day for 3.0 years (3.0 ttl pk-yrs)     Types: Cigarettes     Start date:      Quit date:      Years since quittin.6    Smokeless tobacco: Never   Substance Use Topics    Alcohol use: Yes     Comment: 10 Glasses  "of wine per week. Varies Too much recently 11/2023 cutting back      Wt Readings from Last 1 Encounters:   01/18/24 52.2 kg (115 lb)        Anesthesia Evaluation   Pt has had prior anesthetic.     No history of anesthetic complications       ROS/MED HX  ENT/Pulmonary:     (+)                tobacco use (quit 1980s), Past use,                       Neurologic: Comment: Lumbar radiculopathy - RLE paresthesias      Cardiovascular:  - neg cardiovascular ROS     METS/Exercise Tolerance: >4 METS    Hematologic:     (+) History of blood clots,    pt is not anticoagulated,           Musculoskeletal:       GI/Hepatic:  - neg GI/hepatic ROS     Renal/Genitourinary:  - neg Renal ROS     Endo:     (+)          thyroid problem, hypothyroidism,           Psychiatric/Substance Use:    (-) chronic opioid use history   Infectious Disease:       Malignancy:       Other:      (+)  , H/O Chronic Pain,         Physical Exam    Airway        Mallampati: II   TM distance: > 3 FB   Neck ROM: full   Mouth opening: > 3 cm    Respiratory Devices and Support         Dental       (+) Modest Abnormalities - crowns, retainers, 1 or 2 missing teeth      Cardiovascular          Rhythm and rate: regular     Pulmonary           breath sounds clear to auscultation           OUTSIDE LABS:  CBC:   Lab Results   Component Value Date    WBC 6.2 08/08/2018    HGB 10.9 (L) 08/25/2018    HGB 10.8 (L) 08/24/2018    HCT 42.9 08/08/2018     08/08/2018     BMP:   Lab Results   Component Value Date     08/08/2018    POTASSIUM 4.0 08/08/2018    CHLORIDE 98 08/08/2018    CO2 29 08/08/2018    BUN 15 08/08/2018    CR 0.65 08/08/2018    GLC 90 08/08/2018     COAGS:   Lab Results   Component Value Date    INR 1.09 08/24/2018     POC:   Lab Results   Component Value Date     (H) 08/23/2018     HEPATIC: No results found for: \"ALBUMIN\", \"PROTTOTAL\", \"ALT\", \"AST\", \"GGT\", \"ALKPHOS\", \"BILITOTAL\", \"BILIDIRECT\", \"MARC\"  OTHER:   Lab Results   Component " Value Date    NIURKA 9.8 08/08/2018       Anesthesia Plan    ASA Status:  2    NPO Status:  NPO Appropriate    Anesthesia Type: General.     - Airway: ETT   Induction: Intravenous, Propofol.   Maintenance: TIVA.   Techniques and Equipment:       - Drips/Meds: Phenylephrine, Dexmed. bolus, Ketamine     Consents    Anesthesia Plan(s) and associated risks, benefits, and realistic alternatives discussed. Questions answered and patient/representative(s) expressed understanding.     - Discussed:     - Discussed with:  Patient            Postoperative Care    Pain management: Multi-modal analgesia.   PONV prophylaxis: Dexamethasone or Solumedrol, Ondansetron (or other 5HT-3)     Comments:    Other Comments:     TIVA  Dilaudid on induction  Mag gtt  Ketamine  Robinul  Precedex boluses prn                   Jamia Flanagan MD

## 2024-08-19 LAB
PATH REPORT.COMMENTS IMP SPEC: NORMAL
PATH REPORT.COMMENTS IMP SPEC: NORMAL
PATH REPORT.FINAL DX SPEC: NORMAL
PATH REPORT.GROSS SPEC: NORMAL
PATH REPORT.MICROSCOPIC SPEC OTHER STN: NORMAL
PATH REPORT.RELEVANT HX SPEC: NORMAL
PHOTO IMAGE: NORMAL

## 2025-07-12 ENCOUNTER — HEALTH MAINTENANCE LETTER (OUTPATIENT)
Age: 70
End: 2025-07-12

## 2025-07-21 ASSESSMENT — PATIENT HEALTH QUESTIONNAIRE - PHQ9
10. IF YOU CHECKED OFF ANY PROBLEMS, HOW DIFFICULT HAVE THESE PROBLEMS MADE IT FOR YOU TO DO YOUR WORK, TAKE CARE OF THINGS AT HOME, OR GET ALONG WITH OTHER PEOPLE: SOMEWHAT DIFFICULT
SUM OF ALL RESPONSES TO PHQ QUESTIONS 1-9: 10
SUM OF ALL RESPONSES TO PHQ QUESTIONS 1-9: 10

## 2025-07-21 ASSESSMENT — ANXIETY QUESTIONNAIRES
GAD7 TOTAL SCORE: 9
4. TROUBLE RELAXING: MORE THAN HALF THE DAYS
GAD7 TOTAL SCORE: 9
1. FEELING NERVOUS, ANXIOUS, OR ON EDGE: MORE THAN HALF THE DAYS
5. BEING SO RESTLESS THAT IT IS HARD TO SIT STILL: SEVERAL DAYS
GAD7 TOTAL SCORE: 9
8. IF YOU CHECKED OFF ANY PROBLEMS, HOW DIFFICULT HAVE THESE MADE IT FOR YOU TO DO YOUR WORK, TAKE CARE OF THINGS AT HOME, OR GET ALONG WITH OTHER PEOPLE?: SOMEWHAT DIFFICULT
7. FEELING AFRAID AS IF SOMETHING AWFUL MIGHT HAPPEN: SEVERAL DAYS
2. NOT BEING ABLE TO STOP OR CONTROL WORRYING: SEVERAL DAYS
6. BECOMING EASILY ANNOYED OR IRRITABLE: SEVERAL DAYS
IF YOU CHECKED OFF ANY PROBLEMS ON THIS QUESTIONNAIRE, HOW DIFFICULT HAVE THESE PROBLEMS MADE IT FOR YOU TO DO YOUR WORK, TAKE CARE OF THINGS AT HOME, OR GET ALONG WITH OTHER PEOPLE: SOMEWHAT DIFFICULT
3. WORRYING TOO MUCH ABOUT DIFFERENT THINGS: SEVERAL DAYS
7. FEELING AFRAID AS IF SOMETHING AWFUL MIGHT HAPPEN: SEVERAL DAYS

## 2025-07-22 ENCOUNTER — VIRTUAL VISIT (OUTPATIENT)
Dept: PSYCHOLOGY | Facility: CLINIC | Age: 70
End: 2025-07-22
Payer: COMMERCIAL

## 2025-07-22 DIAGNOSIS — F41.1 GAD (GENERALIZED ANXIETY DISORDER): Primary | ICD-10-CM

## 2025-07-22 DIAGNOSIS — F33.0 MDD (MAJOR DEPRESSIVE DISORDER), RECURRENT EPISODE, MILD: ICD-10-CM

## 2025-07-22 PROCEDURE — 90791 PSYCH DIAGNOSTIC EVALUATION: CPT | Mod: 95 | Performed by: COUNSELOR
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
## 2025-07-23 PROBLEM — F41.1 GAD (GENERALIZED ANXIETY DISORDER): Status: ACTIVE | Noted: 2025-07-23

## 2025-07-23 PROBLEM — F33.0 MDD (MAJOR DEPRESSIVE DISORDER), RECURRENT EPISODE, MILD: Status: ACTIVE | Noted: 2025-07-23

## (undated) DEVICE — DAVINCI XI DRAPE COLUMN 470341

## (undated) DEVICE — DAVINCI XI HANDPIECE ESU VESSEL SEALER 8MM EXT 480422

## (undated) DEVICE — UTERINE MANIPULATOR RUMI 6.7MMX8CM UMB678

## (undated) DEVICE — PREP POVIDONE IODINE SOLUTION 10% 120ML

## (undated) DEVICE — PEN MARKING SKIN W/PAPER RULER 31145785

## (undated) DEVICE — DRAPE SHEET TABLE COVER KC 42301*

## (undated) DEVICE — GOWN IMPERVIOUS SPECIALTY XLG/XLONG 32474

## (undated) DEVICE — SUCTION MANIFOLD NEPTUNE 2 SYS 1 PORT 702-025-000

## (undated) DEVICE — BNDG SPANDAGRIP SZ G LF BEIGE 4.5" SAG13145

## (undated) DEVICE — BLADE KNIFE SURG 15 371115

## (undated) DEVICE — LINEN ORTHO PACK 5446

## (undated) DEVICE — SYR 50ML LL W/O NDL 309653

## (undated) DEVICE — GLOVE PROTEXIS BLUE W/NEU-THERA 8.0  2D73EB80

## (undated) DEVICE — SPONGE LAP 18X18" X8435

## (undated) DEVICE — SU VICRYL 1 CT-1 36" J347H

## (undated) DEVICE — LUBRICANT INST ELECTROLUBE EL101

## (undated) DEVICE — SUCTION MANIFOLD DORNOCH ULTRA CART UL-CL500

## (undated) DEVICE — ESU PENCIL W/SMOKE EVAC NEPTUNE STRYKER 0703-046-000

## (undated) DEVICE — GLOVE PROTEXIS W/NEU-THERA 8.0  2D73TE80

## (undated) DEVICE — DAVINCI XI SEAL UNIVERSAL 5-12MM 470500

## (undated) DEVICE — Device

## (undated) DEVICE — SYR 50ML SLIP TIP W/O NDL 309654

## (undated) DEVICE — DAVINCI HOT SHEARS TIP COVER  400180

## (undated) DEVICE — SOLUTION IV 2B0304X STRL WATER 1000ML

## (undated) DEVICE — DRAPE POUCH INSTRUMENT 3 POCKET 1018L

## (undated) DEVICE — PREP CHLORAPREP 26ML TINTED HI-LITE ORANGE 930815

## (undated) DEVICE — PAD POS XL 1X20X40IN PINK PIGAZZI

## (undated) DEVICE — SU VICRYL 2-0 CT-2 27" UND J269H

## (undated) DEVICE — ESU GROUND PAD ADULT W/CORD E7507

## (undated) DEVICE — DRSG DRAIN 4X4" 7086

## (undated) DEVICE — DECANTER VIAL 2006S

## (undated) DEVICE — PACK TRENGUARD 450 PROCEDURAL 2065406

## (undated) DEVICE — DAVINCI XI OBTURATOR BLADELESS 8MM 470359

## (undated) DEVICE — SU MONOCRYL+ 4-0 18IN PS2 UND MCP496G

## (undated) DEVICE — GLOVE BIOGEL PI SZ 6.5 40865

## (undated) DEVICE — TOURNIQUET CUFF 24" REPRO GREEN 60-7070-104

## (undated) DEVICE — LINEN TOWEL PACK X5 5464

## (undated) DEVICE — BLADE SAW SAGITTAL STRK 19.5X90X1.27MM 2108-109-000S11

## (undated) DEVICE — SOL NACL 0.9% IRRIG 1000ML BOTTLE 2F7124

## (undated) DEVICE — PREP POVIDONE-IODINE 10% SOLUTION 4OZ BOTTLE MDS093944

## (undated) DEVICE — SUCTION STRYKERFLOW II 250-070-500

## (undated) DEVICE — SU DERMABOND PRINEO CLR602US

## (undated) DEVICE — SUCTION IRR SYSTEM W/O TIP INTERPULSE HANDPIECE 0210-100-000

## (undated) DEVICE — NEEDLE HYPO MAGELLAN SAFETY 22GA 1 1/2IN 8881850215

## (undated) DEVICE — CATH FOLEY 16FR 5ML LUBRICATH LATEX 0165L16

## (undated) DEVICE — SU ETHIBOND 1 CT-1 30" X425H

## (undated) DEVICE — SOL WATER IRRIG 1000ML BOTTLE 2F7114

## (undated) DEVICE — TUBING IRRIG TUR Y TYPE 96" LF 6543-01

## (undated) DEVICE — PREP POVIDONE-IODINE 7.5% SCRUB 4OZ BOTTLE MDS093945

## (undated) DEVICE — GLOVE PROTEXIS MICRO 6.5  2D73PM65

## (undated) DEVICE — DRAIN HEMOVAC RESERVOIR KIT 10FR 1/8" MED 00-2550-002-10

## (undated) DEVICE — HOOD FLYTE W/PEELAWAY 408-800-100

## (undated) DEVICE — CAST PADDING 6" STERILE 9046S

## (undated) DEVICE — PROTECTOR ARM STANDARD ONE STEP

## (undated) DEVICE — ANTIFOG SOLUTION SEE SHARP 150M TROCAR SWABS 30978

## (undated) DEVICE — GLOVE GAMMEX NEOPRENE ULTRA SZ 6.5 LF 8513

## (undated) DEVICE — SU WND CLOSURE VLOC 180 ABS 0 12" GS-21 VLOCL0316

## (undated) DEVICE — CUSTOM PACK DA VINCI GYN SMA5BDVHEA

## (undated) DEVICE — BASIN SET MAJOR

## (undated) DEVICE — SUTURE VICRYL+ 0 27IN CT-1 UND VCP260H

## (undated) DEVICE — STRAP KNEE/BODY 31143004

## (undated) DEVICE — BONE CEMENT MIXEVAC III HI VAC KIT  0206-015-000

## (undated) DEVICE — PREP SKIN SCRUB TRAY 4461A

## (undated) DEVICE — SU MONOCRYL 3-0 PS-1 27" Y936H

## (undated) DEVICE — GLOVE UNDER INDICATOR PI SZ 6.5 LF 41665

## (undated) DEVICE — BONE CLEANING TIP INTERPULSE  0210-010-000

## (undated) DEVICE — DRSG ADAPTIC 3X8" 6113

## (undated) DEVICE — TUBING FILTER TRI-LUMEN AIRSEAL ASC-EVAC1

## (undated) DEVICE — NDL SPINAL 20GA 3.5" 405182

## (undated) DEVICE — SURGICEL ENDOSCOPIC APPLICATOR FOR ORC POWDER 3123SPEA

## (undated) DEVICE — DEVICE RUMI II KOH-EFFICIENT 3.5CM KC-RUMI-35

## (undated) DEVICE — DAVINCI XI DRAPE ARM 470015

## (undated) DEVICE — GLOVE PI ULTRATCH M LF SZ 6.5 PF CUFF TEXT STRL LF 42665

## (undated) DEVICE — MAT FLOOR SURGICAL 40X38 0702140238

## (undated) DEVICE — SURGICEL POWDER ABSORBABLE HEMOSTAT 3GM 3013SP

## (undated) DEVICE — NDL INSUFFLATION 13GA 120MM C2201

## (undated) RX ORDER — DEXAMETHASONE SODIUM PHOSPHATE 4 MG/ML
INJECTION, SOLUTION INTRA-ARTICULAR; INTRALESIONAL; INTRAMUSCULAR; INTRAVENOUS; SOFT TISSUE
Status: DISPENSED
Start: 2018-08-23

## (undated) RX ORDER — PROPOFOL 10 MG/ML
INJECTION, EMULSION INTRAVENOUS
Status: DISPENSED
Start: 2024-08-14

## (undated) RX ORDER — CLINDAMYCIN PHOSPHATE 900 MG/50ML
INJECTION, SOLUTION INTRAVENOUS
Status: DISPENSED
Start: 2018-08-23

## (undated) RX ORDER — PROPOFOL 10 MG/ML
INJECTION, EMULSION INTRAVENOUS
Status: DISPENSED
Start: 2018-08-23

## (undated) RX ORDER — GABAPENTIN 100 MG/1
CAPSULE ORAL
Status: DISPENSED
Start: 2018-08-23

## (undated) RX ORDER — IPRATROPIUM BROMIDE AND ALBUTEROL SULFATE 2.5; .5 MG/3ML; MG/3ML
SOLUTION RESPIRATORY (INHALATION)
Status: DISPENSED
Start: 2018-08-23

## (undated) RX ORDER — DEXAMETHASONE SODIUM PHOSPHATE 10 MG/ML
INJECTION, SOLUTION INTRAMUSCULAR; INTRAVENOUS
Status: DISPENSED
Start: 2024-08-14

## (undated) RX ORDER — LIDOCAINE HYDROCHLORIDE 20 MG/ML
INJECTION, SOLUTION EPIDURAL; INFILTRATION; INTRACAUDAL; PERINEURAL
Status: DISPENSED
Start: 2018-08-23

## (undated) RX ORDER — KETOROLAC TROMETHAMINE 30 MG/ML
INJECTION, SOLUTION INTRAMUSCULAR; INTRAVENOUS
Status: DISPENSED
Start: 2018-08-23

## (undated) RX ORDER — SODIUM CHLORIDE, SODIUM LACTATE, POTASSIUM CHLORIDE, CALCIUM CHLORIDE 600; 310; 30; 20 MG/100ML; MG/100ML; MG/100ML; MG/100ML
INJECTION, SOLUTION INTRAVENOUS
Status: DISPENSED
Start: 2018-08-23

## (undated) RX ORDER — PHENYLEPHRINE HCL IN 0.9% NACL 1 MG/10 ML
SYRINGE (ML) INTRAVENOUS
Status: DISPENSED
Start: 2018-08-23

## (undated) RX ORDER — ACETAMINOPHEN 325 MG/1
TABLET ORAL
Status: DISPENSED
Start: 2018-08-23

## (undated) RX ORDER — FENTANYL CITRATE 50 UG/ML
INJECTION, SOLUTION INTRAMUSCULAR; INTRAVENOUS
Status: DISPENSED
Start: 2018-08-23

## (undated) RX ORDER — BUPIVACAINE HYDROCHLORIDE 2.5 MG/ML
INJECTION, SOLUTION EPIDURAL; INFILTRATION; INTRACAUDAL
Status: DISPENSED
Start: 2024-08-14

## (undated) RX ORDER — ONDANSETRON 2 MG/ML
INJECTION INTRAMUSCULAR; INTRAVENOUS
Status: DISPENSED
Start: 2018-08-23

## (undated) RX ORDER — FENTANYL CITRATE 50 UG/ML
INJECTION, SOLUTION INTRAMUSCULAR; INTRAVENOUS
Status: DISPENSED
Start: 2024-08-14

## (undated) RX ORDER — LIDOCAINE HYDROCHLORIDE 10 MG/ML
INJECTION, SOLUTION EPIDURAL; INFILTRATION; INTRACAUDAL; PERINEURAL
Status: DISPENSED
Start: 2024-08-14